# Patient Record
Sex: FEMALE | Race: WHITE | NOT HISPANIC OR LATINO | Employment: UNEMPLOYED | URBAN - METROPOLITAN AREA
[De-identification: names, ages, dates, MRNs, and addresses within clinical notes are randomized per-mention and may not be internally consistent; named-entity substitution may affect disease eponyms.]

---

## 2023-05-05 DIAGNOSIS — Z00.00 ROUTINE HEALTH MAINTENANCE: ICD-10-CM

## 2023-05-31 DIAGNOSIS — E78.00 HYPERCHOLESTEREMIA: Primary | ICD-10-CM

## 2023-05-31 RX ORDER — ATORVASTATIN CALCIUM 20 MG/1
1 TABLET, FILM COATED ORAL DAILY
COMMUNITY
Start: 2018-04-18 | End: 2023-05-31 | Stop reason: SDUPTHER

## 2023-05-31 RX ORDER — ATORVASTATIN CALCIUM 20 MG/1
20 TABLET, FILM COATED ORAL DAILY
Qty: 90 TABLET | Refills: 3 | Status: SHIPPED | OUTPATIENT
Start: 2023-05-31 | End: 2023-09-12 | Stop reason: DRUGHIGH

## 2023-06-01 NOTE — PROGRESS NOTES
Physical Exam    Name Kathrine Ramírez    Date of Service :6/5/2023      Kathrine Ramírez  68 y.o. is here for an annual physical exam  Past medical history significant for  Infertility but did conceive 1 child on her own and hospitalized for childbirth x1 she adopted a child also  Arthritis DJD especially knees  Renal calculi on a few occasions 2010 2016 recently September 2022  CT scan at that time showed right kidney stone and hydronephrosis also question of a left ovarian lesion  Osteopenia had been on Fosamax for over 10 years she has been off medications and bone density has been pretty stable  Hypercholesteremia on statin therapy  Chronic postnasal drip  Hyperglycemia and has followed routinely with endocrinology has considered going on metformin she sees Dr Aquino  She does follow with breast specialist because of increased risk of breast cancer  They live in [place] 6 months of the year  They have a new grandson who is 18 months old here in the [facility]    Past Medical History:   Diagnosis Date    Acute upper respiratory infection, unspecified 09/06/2017    Acute URI    Encounter for gynecological examination (general) (routine) without abnormal findings 06/10/2021    Well female exam with routine gynecological exam    Flushing     Hot flashes    Other conditions influencing health status     History of pregnancy    Personal history of other (healed) physical injury and trauma 09/06/2017    History of abrasion    Personal history of other diseases of the female genital tract 06/16/2015    History of postmenopausal bleeding    Superficial mycosis, unspecified 09/20/2017    Superficial fungal infection of skin       Past Surgical History:   Procedure Laterality Date    DILATION AND CURETTAGE OF UTERUS  11/26/2013    Dilation And Curettage    LAPAROSCOPY DIAGNOSTIC / BIOPSY / ASPIRATION / LYSIS  11/26/2013    Exploratory Laparoscopy        Social History     Tobacco Use    Smoking status: Never    Smokeless  "tobacco: Never   Vaping Use    Vaping status: Never Used        Social History     Social History Narrative    She is originally from [place] has been in [place] for some time then moved for her 's work    She has 2 children 1 biologic son who lives in [place] and adopted daughter who lives here in the [place] area    1 grandson who is 18 months old    They spend 6 months of the year in [place]    Her diet is pretty healthy    She exercises routinely walks daily plays a lot of pickleball    Has never been a smoker    She does not drink alcohol    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    Has not really worked since having children    She has been doing a lot of photography which is really her passion       Family History   Problem Relation Name Age of Onset    Dementia Mother      Lymphoma Father      Other (gout) Brother      Nephrolithiasis Brother          /72   Ht 1.473 m (4' 10\")   Wt 57.6 kg (127 lb)   BMI 26.54 kg/m²     Physical Exam    Physical examination  Reveals a well-developed woman in no acute distress    appearance is younger than her stated age she is suntanned  HEENT exam  Extraocular motion is intact  Tympanic membranes and external auditory canals are normal  Oropharynx is normal  There is no cervical lymphadenopathy appreciated  The thyroid is within normal limits    Lungs    clear to auscultation and percussion    Cardiovascular   regular rate and rhythm  No murmurs rubs or gallops are appreciated    Breast examination   No dominant masses nipple discharge or axillary lymphadenopathy is appreciated    Abdomen   soft nontender bowel sounds are positive   there is no organomegaly noted  She is up-to-date with gynecology    Periphery  Pulses are present without deficits noted  No peripheral edema is noted    Musculoskeletal  Gait is normal  Is no joint erythema or swelling noted  Range of motion is within normal limits  Strength " is 5 of 5 without deficits noted    Dermatology  No concerning skin lesions are noted    Neurology  No deficits are noted  Judgment appears appropriate  Mood and affect are appropriate     Results from last 7 days   Lab Units 06/02/23  0917   WBC AUTO x10E9/L 8.0   HEMOGLOBIN g/dL 14.0   HEMATOCRIT % 43.7   PLATELETS AUTO x10E9/L 243   NEUTROS PCT AUTO % 75.5   LYMPHS PCT AUTO % 17.7   MONOS PCT AUTO % 5.4   EOS PCT AUTO % 0.6      Results from last 7 days   Lab Units 06/02/23  0917   HEMOGLOBIN A1C % 6.4*     Results from last 7 days   Lab Units 06/02/23  0917   SODIUM mmol/L 141   POTASSIUM mmol/L 4.8   CHLORIDE mmol/L 105   CO2 mmol/L 29   BUN mg/dL 26*   CREATININE mg/dL 0.78   CALCIUM mg/dL 9.7   PROTEIN TOTAL g/dL 6.8   BILIRUBIN TOTAL mg/dL 0.5   ALK PHOS U/L 87   ALT U/L 17   AST U/L 17   GLUCOSE mg/dL 102*      Results from last 7 days   Lab Units 06/02/23  0917   CHOLESTEROL mg/dL 159   TRIGLYCERIDES mg/dL 74   HDL mg/dL 49.7   LDLF mg/dL 95           Results from last 7 days   Lab Units 06/02/23  0917   TSH mIU/L 2.73           No lab exists for component: UAPPEAR, UCOLOR  No components found for: UA  Lab on 06/02/2023   Component Date Value Ref Range Status    WBC 06/02/2023 8.0  4.4 - 11.3 x10E9/L Final    nRBC 06/02/2023 0.0  0.0 - 0.0 /100 WBC Final    RBC 06/02/2023 4.78  4.00 - 5.20 x10E12/L Final    Hemoglobin 06/02/2023 14.0  12.0 - 16.0 g/dL Final    Hematocrit 06/02/2023 43.7  36.0 - 46.0 % Final    MCV 06/02/2023 91  80 - 100 fL Final    MCHC 06/02/2023 32.0  32.0 - 36.0 g/dL Final    Platelets 06/02/2023 243  150 - 450 x10E9/L Final    RDW 06/02/2023 12.8  11.5 - 14.5 % Final    Neutrophils % 06/02/2023 75.5  40.0 - 80.0 % Final    Immature Granulocytes %, Automated 06/02/2023 0.4  0.0 - 0.9 % Final     Immature Granulocyte Count (IG) includes promyelocytes,    myelocytes and metamyelocytes but does not include bands.   Percent differential counts (%) should be interpreted in the   context of  the absolute cell counts (cells/L).    Lymphocytes % 06/02/2023 17.7  13.0 - 44.0 % Final    Monocytes % 06/02/2023 5.4  2.0 - 10.0 % Final    Eosinophils % 06/02/2023 0.6  0.0 - 6.0 % Final    Basophils % 06/02/2023 0.4  0.0 - 2.0 % Final    Neutrophils Absolute 06/02/2023 6.06  1.20 - 7.70 x10E9/L Final    Lymphocytes Absolute 06/02/2023 1.42  1.20 - 4.80 x10E9/L Final    Monocytes Absolute 06/02/2023 0.43  0.10 - 1.00 x10E9/L Final    Eosinophils Absolute 06/02/2023 0.05  0.00 - 0.70 x10E9/L Final    Basophils Absolute 06/02/2023 0.03  0.00 - 0.10 x10E9/L Final    Glucose 06/02/2023 102 (H)  74 - 99 mg/dL Final    Sodium 06/02/2023 141  136 - 145 mmol/L Final    Potassium 06/02/2023 4.8  3.5 - 5.3 mmol/L Final    Chloride 06/02/2023 105  98 - 107 mmol/L Final    Bicarbonate 06/02/2023 29  21 - 32 mmol/L Final    Anion Gap 06/02/2023 12  10 - 20 mmol/L Final    Urea Nitrogen 06/02/2023 26 (H)  6 - 23 mg/dL Final    Creatinine 06/02/2023 0.78  0.50 - 1.05 mg/dL Final    GFR Female 06/02/2023 82  >90 mL/min/1.73m2 Final     CALCULATIONS OF ESTIMATED GFR ARE PERFORMED   USING THE 2021 CKD-EPI STUDY REFIT EQUATION   WITHOUT THE RACE VARIABLE FOR THE IDMS-TRACEABLE   CREATININE METHODS.    https://jasn.asnjournals.org/content/early/2021/09/22/ASN.0038997473    Calcium 06/02/2023 9.7  8.6 - 10.6 mg/dL Final    Albumin 06/02/2023 4.1  3.4 - 5.0 g/dL Final    Alkaline Phosphatase 06/02/2023 87  33 - 136 U/L Final    Total Protein 06/02/2023 6.8  6.4 - 8.2 g/dL Final    AST 06/02/2023 17  9 - 39 U/L Final    Total Bilirubin 06/02/2023 0.5  0.0 - 1.2 mg/dL Final    ALT (SGPT) 06/02/2023 17  7 - 45 U/L Final     Patients treated with Sulfasalazine may generate    falsely decreased results for ALT.    CRP, High Sensitivity 06/02/2023 2.0  mg/L Final        hsCRP         INTERPRETATION       mg/L  -------------------------------------------      < 1.0      LOW RELATIVE RISK OF CVD      1.0-3.0    AVERAGE RELATIVE RISK OF CVD       > 3.0      HIGH RELATIVE RISK OF CVD     Source:  RADHA THuy ELLER. et al. CIRCULATION 2003;  107:499-511.    Hemoglobin A1C 06/02/2023 6.4 (A)  % Final         Diagnosis of Diabetes-Adults   Non-Diabetic: < or = 5.6%   Increased risk for developing diabetes: 5.7-6.4%   Diagnostic of diabetes: > or = 6.5%  .       Monitoring of Diabetes                Age (y)     Therapeutic Goal (%)   Adults:          >18           <7.0   Pediatrics:    13-18           <7.5                   7-12           <8.0                   0- 6            7.5-8.5   American Diabetes Association. Diabetes Care 33(S1), Jan 2010.    Estimated Average Glucose 06/02/2023 137  MG/DL Final    Cholesterol 06/02/2023 159  0 - 199 mg/dL Final    .      AGE      DESIRABLE   BORDERLINE HIGH   HIGH     0-19 Y     0 - 169       170 - 199     >/= 200    20-24 Y     0 - 189       190 - 224     >/= 225         >24 Y     0 - 199       200 - 239     >/= 240   **All ranges are based on fasting samples. Specific   therapeutic targets will vary based on patient-specific   cardiac risk.  .   Pediatric guidelines reference:Pediatrics 2011, 128(S5).   Adult guidelines reference: NCEP ATPIII Guidelines,     IRENE 2001, 258:2486-97  .   Venipuncture immediately after or during the    administration of Metamizole may lead to falsely   low results. Testing should be performed immediately   prior to Metamizole dosing.    HDL 06/02/2023 49.7  mg/dL Final    .      AGE      VERY LOW   LOW     NORMAL    HIGH       0-19 Y       < 35   < 40     40-45     ----    20-24 Y       ----   < 40       >45     ----      >24 Y       ----   < 40     40-60      >60  .    Cholesterol/HDL Ratio 06/02/2023 3.2   Final    REF VALUES  DESIRABLE  < 3.4  HIGH RISK  > 5.0    LDL 06/02/2023 95  0 - 99 mg/dL Final    .                           NEAR      BORD      AGE      DESIRABLE  OPTIMAL    HIGH     HIGH     VERY HIGH     0-19 Y     0 - 109     ---    110-129   >/= 130     ----    20-24 Y     0  - 119     ---    120-159   >/= 160     ----      >24 Y     0 -  99   100-129  130-159   160-189     >/=190  .    VLDL 06/02/2023 15  0 - 40 mg/dL Final    Triglycerides 06/02/2023 74  0 - 149 mg/dL Final    .      AGE      DESIRABLE   BORDERLINE HIGH   HIGH     VERY HIGH   0 D-90 D    19 - 174         ----         ----        ----  91 D- 9 Y     0 -  74        75 -  99     >/= 100      ----    10-19 Y     0 -  89        90 - 129     >/= 130      ----    20-24 Y     0 - 114       115 - 149     >/= 150      ----         >24 Y     0 - 149       150 - 199    200- 499    >/= 500  .   Venipuncture immediately after or during the    administration of Metamizole may lead to falsely   low results. Testing should be performed immediately   prior to Metamizole dosing.    TSH 06/02/2023 2.73  0.44 - 3.98 mIU/L Final     TSH testing is performed using different testing    methodology at [facility] than at other    [facility]. Direct result comparisons should    only be made within the same method.    WBC, Urine 06/02/2023 1  0 - 5 /HPF Final    RBC, Urine 06/02/2023 1  0 - 5 /HPF Final    Squamous Epithelial Cells, Urine 06/02/2023 4  /HPF Final    Bacteria, Urine 06/02/2023 1+ (A)  /HPF Final    Vitamin D, 25-Hydroxy 06/02/2023 74  ng/mL Final    .  DEFICIENCY:         < 20   NG/ML  INSUFFICIENCY:      20-29  NG/ML  SUFFICIENCY:         NG/ML    THIS ASSAY ACCURATELY QUANTIFIES THE SUM OF  VITAMIN D3, 25-HYDROXY AND VIT D2,25-HYDROXY.       ECG: normal sinus rhythm, no blocks or conduction defects, no ischemic changes.     Problem List Items Addressed This Visit          Circulatory    Elevated BP without diagnosis of hypertension       Genitourinary    Kidney stones       Musculoskeletal    Osteopenia       Other    Hyperglycemia     Other Visit Diagnoses       Asymptomatic menopausal state    -  Primary    Relevant Orders    XR DEXA bone density            Assessment/Plan   #1 prediabetes hemoglobin  A1c remains pretty stable in this prediabetic range at 6.4 and has been stable she would like to continue to follow her diet and exercise which is fine she does see endocrinology as well  2.  Hypercholesteremia cholesterol values have been under good control continue current regimen I do feel she could benefit from a coronary artery calcium score especially since she has prediabetes hypercholesteremia and borderline blood pressure readings with family history of heart disease as well therefore this has been ordered  3.  Elevated blood pressure reading note that I had gotten elevated blood pressure reading today medical assistant had not she has had very blood pressure readings as I look through her vital signs I wonder if she has some element of whitecoat hypertension I would like her to monitor her blood pressure at home routinely at least over the next few weeks and check in with us  4.  Renal calculi last episode October she does see urology  5.  Dense breast tissue some increased risk of breast cancer follows with high risk breast she is up-to-date with imaging  6.  DJD stable  7.  Health maintenance  Congratulated her on healthy lifestyle  Up-to-date with immunizations including Prevnar 13 and Pneumovax she has received Shingrix vaccination her last Covid 19 booster has been more than 6 months ago I have recommended this  Colonoscopy last 2020 2 repeat 5 years or 2027 because of polyp  Mammogram after September  We discussed adding yoga to her exercise regimen for balance  8.  Osteopenia bone density has been pretty stable she is due at any time for repeat bone density requisition was given she usually does this in [place]

## 2023-06-02 ENCOUNTER — LAB (OUTPATIENT)
Dept: LAB | Facility: LAB | Age: 68
End: 2023-06-02
Payer: MEDICARE

## 2023-06-02 DIAGNOSIS — Z00.00 ROUTINE HEALTH MAINTENANCE: ICD-10-CM

## 2023-06-02 LAB
ALANINE AMINOTRANSFERASE (SGPT) (U/L) IN SER/PLAS: 17 U/L (ref 7–45)
ALBUMIN (G/DL) IN SER/PLAS: 4.1 G/DL (ref 3.4–5)
ALKALINE PHOSPHATASE (U/L) IN SER/PLAS: 87 U/L (ref 33–136)
ANION GAP IN SER/PLAS: 12 MMOL/L (ref 10–20)
ASPARTATE AMINOTRANSFERASE (SGOT) (U/L) IN SER/PLAS: 17 U/L (ref 9–39)
BACTERIA, URINE: ABNORMAL /HPF
BASOPHILS (10*3/UL) IN BLOOD BY AUTOMATED COUNT: 0.03 X10E9/L (ref 0–0.1)
BASOPHILS/100 LEUKOCYTES IN BLOOD BY AUTOMATED COUNT: 0.4 % (ref 0–2)
BILIRUBIN TOTAL (MG/DL) IN SER/PLAS: 0.5 MG/DL (ref 0–1.2)
C REACTIVE PROTEIN (MG/L) IN SER/PLAS BY HIGH SENSIT: 2 MG/L
CALCIDIOL (25 OH VITAMIN D3) (NG/ML) IN SER/PLAS: 74 NG/ML
CALCIUM (MG/DL) IN SER/PLAS: 9.7 MG/DL (ref 8.6–10.6)
CARBON DIOXIDE, TOTAL (MMOL/L) IN SER/PLAS: 29 MMOL/L (ref 21–32)
CHLORIDE (MMOL/L) IN SER/PLAS: 105 MMOL/L (ref 98–107)
CHOLESTEROL (MG/DL) IN SER/PLAS: 159 MG/DL (ref 0–199)
CHOLESTEROL IN HDL (MG/DL) IN SER/PLAS: 49.7 MG/DL
CHOLESTEROL/HDL RATIO: 3.2
CREATININE (MG/DL) IN SER/PLAS: 0.78 MG/DL (ref 0.5–1.05)
EOSINOPHILS (10*3/UL) IN BLOOD BY AUTOMATED COUNT: 0.05 X10E9/L (ref 0–0.7)
EOSINOPHILS/100 LEUKOCYTES IN BLOOD BY AUTOMATED COUNT: 0.6 % (ref 0–6)
ERYTHROCYTE DISTRIBUTION WIDTH (RATIO) BY AUTOMATED COUNT: 12.8 % (ref 11.5–14.5)
ERYTHROCYTE MEAN CORPUSCULAR HEMOGLOBIN CONCENTRATION (G/DL) BY AUTOMATED: 32 G/DL (ref 32–36)
ERYTHROCYTE MEAN CORPUSCULAR VOLUME (FL) BY AUTOMATED COUNT: 91 FL (ref 80–100)
ERYTHROCYTES (10*6/UL) IN BLOOD BY AUTOMATED COUNT: 4.78 X10E12/L (ref 4–5.2)
ESTIMATED AVERAGE GLUCOSE FOR HBA1C: 137 MG/DL
GFR FEMALE: 82 ML/MIN/1.73M2
GLUCOSE (MG/DL) IN SER/PLAS: 102 MG/DL (ref 74–99)
HEMATOCRIT (%) IN BLOOD BY AUTOMATED COUNT: 43.7 % (ref 36–46)
HEMOGLOBIN (G/DL) IN BLOOD: 14 G/DL (ref 12–16)
HEMOGLOBIN A1C/HEMOGLOBIN TOTAL IN BLOOD: 6.4 %
IMMATURE GRANULOCYTES/100 LEUKOCYTES IN BLOOD BY AUTOMATED COUNT: 0.4 % (ref 0–0.9)
LDL: 95 MG/DL (ref 0–99)
LEUKOCYTES (10*3/UL) IN BLOOD BY AUTOMATED COUNT: 8 X10E9/L (ref 4.4–11.3)
LYMPHOCYTES (10*3/UL) IN BLOOD BY AUTOMATED COUNT: 1.42 X10E9/L (ref 1.2–4.8)
LYMPHOCYTES/100 LEUKOCYTES IN BLOOD BY AUTOMATED COUNT: 17.7 % (ref 13–44)
MONOCYTES (10*3/UL) IN BLOOD BY AUTOMATED COUNT: 0.43 X10E9/L (ref 0.1–1)
MONOCYTES/100 LEUKOCYTES IN BLOOD BY AUTOMATED COUNT: 5.4 % (ref 2–10)
NEUTROPHILS (10*3/UL) IN BLOOD BY AUTOMATED COUNT: 6.06 X10E9/L (ref 1.2–7.7)
NEUTROPHILS/100 LEUKOCYTES IN BLOOD BY AUTOMATED COUNT: 75.5 % (ref 40–80)
NRBC (PER 100 WBCS) BY AUTOMATED COUNT: 0 /100 WBC (ref 0–0)
PLATELETS (10*3/UL) IN BLOOD AUTOMATED COUNT: 243 X10E9/L (ref 150–450)
POTASSIUM (MMOL/L) IN SER/PLAS: 4.8 MMOL/L (ref 3.5–5.3)
PROTEIN TOTAL: 6.8 G/DL (ref 6.4–8.2)
RBC, URINE: 1 /HPF (ref 0–5)
SODIUM (MMOL/L) IN SER/PLAS: 141 MMOL/L (ref 136–145)
SQUAMOUS EPITHELIAL CELLS, URINE: 4 /HPF
THYROTROPIN (MIU/L) IN SER/PLAS BY DETECTION LIMIT <= 0.05 MIU/L: 2.73 MIU/L (ref 0.44–3.98)
TRIGLYCERIDE (MG/DL) IN SER/PLAS: 74 MG/DL (ref 0–149)
UREA NITROGEN (MG/DL) IN SER/PLAS: 26 MG/DL (ref 6–23)
VLDL: 15 MG/DL (ref 0–40)
WBC, URINE: 1 /HPF (ref 0–5)

## 2023-06-02 PROCEDURE — 83036 HEMOGLOBIN GLYCOSYLATED A1C: CPT

## 2023-06-02 PROCEDURE — 81001 URINALYSIS AUTO W/SCOPE: CPT

## 2023-06-02 PROCEDURE — 84443 ASSAY THYROID STIM HORMONE: CPT

## 2023-06-02 PROCEDURE — 86141 C-REACTIVE PROTEIN HS: CPT

## 2023-06-02 PROCEDURE — 80061 LIPID PANEL: CPT

## 2023-06-02 PROCEDURE — 36415 COLL VENOUS BLD VENIPUNCTURE: CPT

## 2023-06-02 PROCEDURE — 80053 COMPREHEN METABOLIC PANEL: CPT

## 2023-06-02 PROCEDURE — 85025 COMPLETE CBC W/AUTO DIFF WBC: CPT

## 2023-06-02 PROCEDURE — 82306 VITAMIN D 25 HYDROXY: CPT

## 2023-06-05 ENCOUNTER — OFFICE VISIT (OUTPATIENT)
Dept: PRIMARY CARE | Facility: CLINIC | Age: 68
End: 2023-06-05
Payer: MEDICARE

## 2023-06-05 VITALS
SYSTOLIC BLOOD PRESSURE: 146 MMHG | BODY MASS INDEX: 26.66 KG/M2 | DIASTOLIC BLOOD PRESSURE: 72 MMHG | WEIGHT: 127 LBS | HEIGHT: 58 IN

## 2023-06-05 DIAGNOSIS — Z78.0 ASYMPTOMATIC MENOPAUSAL STATE: Primary | ICD-10-CM

## 2023-06-05 DIAGNOSIS — R73.9 HYPERGLYCEMIA: ICD-10-CM

## 2023-06-05 DIAGNOSIS — M85.80 OSTEOPENIA, UNSPECIFIED LOCATION: ICD-10-CM

## 2023-06-05 DIAGNOSIS — Z00.00 PHYSICAL EXAM: ICD-10-CM

## 2023-06-05 DIAGNOSIS — N20.0 KIDNEY STONES: ICD-10-CM

## 2023-06-05 DIAGNOSIS — R03.0 ELEVATED BP WITHOUT DIAGNOSIS OF HYPERTENSION: ICD-10-CM

## 2023-06-05 DIAGNOSIS — E78.5 HYPERLIPIDEMIA, UNSPECIFIED HYPERLIPIDEMIA TYPE: ICD-10-CM

## 2023-06-05 PROBLEM — R92.2 DENSE BREASTS: Status: ACTIVE | Noted: 2023-06-05

## 2023-06-05 PROBLEM — R92.30 DENSE BREASTS: Status: ACTIVE | Noted: 2023-06-05

## 2023-06-05 PROBLEM — E88.810 DYSMETABOLIC SYNDROME: Status: ACTIVE | Noted: 2023-06-05

## 2023-06-05 PROCEDURE — 93000 ELECTROCARDIOGRAM COMPLETE: CPT | Performed by: INTERNAL MEDICINE

## 2023-06-05 PROCEDURE — 1160F RVW MEDS BY RX/DR IN RCRD: CPT | Performed by: INTERNAL MEDICINE

## 2023-06-05 PROCEDURE — UHSPHYS PR UH SELECT PHYSICAL: Performed by: INTERNAL MEDICINE

## 2023-06-05 PROCEDURE — 1159F MED LIST DOCD IN RCRD: CPT | Performed by: INTERNAL MEDICINE

## 2023-06-05 PROCEDURE — 1036F TOBACCO NON-USER: CPT | Performed by: INTERNAL MEDICINE

## 2023-06-05 NOTE — PATIENT INSTRUCTIONS
It was good to see you.  You are doing a good job with your overall health care.   Your blood pressure is elevated today.  It has been elevated intermittently as I review records.  I would like you to check your blood pressure.  I recommend Omron blood pressure monitor.  Please check it daily for the next 2 to 3 weeks at various times during the day.  Report back to me your values if you would consistently be greater than 140 systolic may need to consider medications.  Your diastolic blood pressure readings seem to be fine over time  Mammogram will be due after September  Bone density repeat will be due anytime now I am going to give you the requisition appears you have had this performed at a [facility] the last couple times I am fine with you waiting until you return to [place] to do this  Repeat colonoscopy 2027  Continue routine regular exercise  Add yoga to your regimen   Dermatology follow up       I encourage you to stay active and healthy, and to follow these healthy habits:     Try to increase your intake of fish such as salmon and tuna and try to get 2 to 3 servings of fish per week.  Increase your intake of plant-based protein listed here:    Unprocessed nuts, walnuts, or almonds, Nuts and Seeds.      Green vegetables such as Broccoli, Peas, Greens, Spinach    Beans, Chickpeas, & Lentils    Other sources include Potatoes, Quinoa, Seaweed, Soymilk, Tempeh, and Tofu.  Increase food s higher in flavonoids found in black tea, green tea, apples, nuts, citrus fruit, berries, and dark chocolate.  You should avoid fried foods, and sugary or starchy foods and sugary drinks, and void saturated fats.    Try not to dine at restaurants frequently, and avoid fast food restaurants.      Try to get restful sleep approximately 7-9 hours every night.  Work towards getting 30 minutes of  moderate intensity exercise 4 to 5 days per week.  You should also try to exercise at least one hour per day with light  walking.

## 2023-06-20 DIAGNOSIS — I10 PRIMARY HYPERTENSION: Primary | ICD-10-CM

## 2023-06-20 RX ORDER — OLMESARTAN MEDOXOMIL 5 MG/1
5 TABLET ORAL DAILY
Qty: 30 TABLET | Refills: 11 | Status: SHIPPED | OUTPATIENT
Start: 2023-06-20 | End: 2023-06-21 | Stop reason: SDUPTHER

## 2023-06-21 DIAGNOSIS — I10 PRIMARY HYPERTENSION: ICD-10-CM

## 2023-06-21 RX ORDER — OLMESARTAN MEDOXOMIL 5 MG/1
5 TABLET ORAL DAILY
Qty: 90 TABLET | Refills: 3 | Status: SHIPPED | OUTPATIENT
Start: 2023-06-21 | End: 2024-05-31

## 2023-07-24 NOTE — PROGRESS NOTES
Subjective   Patient ID: Kathrine Ramírez is a 68 y.o. female.    HPI  Patient presents today in follow-up and for blood pressure check.  She is 68 years old she is generally very healthy  Hypercholesteremia and recently diagnosed with hypertension we had just started the olmesartan at 5 mg daily  She has been monitoring her blood pressures at home  the have been good at home sometimes as low as 107 systolic  She feels well  She however developed pain in the right side of her neck just over the last 2 days no known injury but is very stiff especially if she turns to the left the right side of her neck hurts it does not radiate into her arm      Review of Systems    Objective   Physical Exam  Well-developed no acute distress appears younger than age  HEENT exam there is some pain to palpation in the right trapezius muscle reflexes present without deficits noted  Lungs clear to auscultation percussion  Cardiovascular regular rate and rhythm    Assessment/Plan   1 hypertension under very good control continue current regimen she is feeling well with this medication and no undue side effects she does not need to check her pressure daily but to check it intermittently  2.  Neck pain no known injury no neurologic deficits we will try meloxicam 15 mg daily for the next 1 to 2 weeks we did discuss I do not want her to use anti-inflammatories long-term because of risk of high blood pressure chronic kidney disease she will let us know however if further problems issues or the neck pain does not resolve  3.  Health maintenance she just had dermatology appointment no suspicious lesions were noted had skin tag removed  20 minutes were spent with patient of which greater than 50% was spent in counseling and coordination of care  This note was partially generated using the Dragon voice recognition system.  There may be some incorrect wording ,grammar, spelling or punctuation errors that were not corrected prior to committing the note  to the medical record.   There are no diagnoses linked to this encounter.

## 2023-07-25 ENCOUNTER — OFFICE VISIT (OUTPATIENT)
Dept: PRIMARY CARE | Facility: CLINIC | Age: 68
End: 2023-07-25
Payer: MEDICARE

## 2023-07-25 ENCOUNTER — APPOINTMENT (OUTPATIENT)
Dept: PRIMARY CARE | Facility: CLINIC | Age: 68
End: 2023-07-25
Payer: MEDICARE

## 2023-07-25 VITALS
HEIGHT: 58 IN | DIASTOLIC BLOOD PRESSURE: 72 MMHG | BODY MASS INDEX: 27.5 KG/M2 | HEART RATE: 68 BPM | WEIGHT: 131 LBS | OXYGEN SATURATION: 95 % | SYSTOLIC BLOOD PRESSURE: 124 MMHG

## 2023-07-25 DIAGNOSIS — M54.2 NECK PAIN: Primary | ICD-10-CM

## 2023-07-25 DIAGNOSIS — I10 PRIMARY HYPERTENSION: ICD-10-CM

## 2023-07-25 PROCEDURE — 99213 OFFICE O/P EST LOW 20 MIN: CPT | Performed by: INTERNAL MEDICINE

## 2023-07-25 PROCEDURE — 3078F DIAST BP <80 MM HG: CPT | Performed by: INTERNAL MEDICINE

## 2023-07-25 PROCEDURE — 3074F SYST BP LT 130 MM HG: CPT | Performed by: INTERNAL MEDICINE

## 2023-07-25 PROCEDURE — 1036F TOBACCO NON-USER: CPT | Performed by: INTERNAL MEDICINE

## 2023-07-25 PROCEDURE — 1160F RVW MEDS BY RX/DR IN RCRD: CPT | Performed by: INTERNAL MEDICINE

## 2023-07-25 PROCEDURE — 1159F MED LIST DOCD IN RCRD: CPT | Performed by: INTERNAL MEDICINE

## 2023-07-25 PROCEDURE — 1126F AMNT PAIN NOTED NONE PRSNT: CPT | Performed by: INTERNAL MEDICINE

## 2023-07-25 RX ORDER — MELOXICAM 15 MG/1
15 TABLET ORAL DAILY
Qty: 30 TABLET | Refills: 1 | Status: SHIPPED | OUTPATIENT
Start: 2023-07-25 | End: 2023-09-23

## 2023-07-25 RX ORDER — ESTRADIOL 0.1 MG/G
CREAM VAGINAL
COMMUNITY
Start: 2016-06-13 | End: 2024-03-18 | Stop reason: SDUPTHER

## 2023-07-25 RX ORDER — GLUCOSAMINE/MSM/CHONDROIT SULF 500-166.6
TABLET ORAL
COMMUNITY

## 2023-07-25 ASSESSMENT — PAIN SCALES - GENERAL: PAINLEVEL: 0-NO PAIN

## 2023-09-12 DIAGNOSIS — E78.00 HYPERCHOLESTEREMIA: ICD-10-CM

## 2023-09-12 RX ORDER — ATORVASTATIN CALCIUM 40 MG/1
40 TABLET, FILM COATED ORAL DAILY
Qty: 90 TABLET | Refills: 3 | Status: SHIPPED | OUTPATIENT
Start: 2023-09-12 | End: 2024-09-11

## 2023-09-12 RX ORDER — ATORVASTATIN CALCIUM 40 MG/1
40 TABLET, FILM COATED ORAL DAILY
Qty: 90 TABLET | Refills: 3 | Status: SHIPPED | OUTPATIENT
Start: 2023-09-12 | End: 2023-09-12 | Stop reason: SDUPTHER

## 2023-09-28 DIAGNOSIS — Z00.00 HEALTHCARE MAINTENANCE: Primary | ICD-10-CM

## 2023-10-03 DIAGNOSIS — U07.1 COVID-19: Primary | ICD-10-CM

## 2023-10-17 ENCOUNTER — APPOINTMENT (OUTPATIENT)
Dept: ENDOCRINOLOGY | Facility: CLINIC | Age: 68
End: 2023-10-17
Payer: MEDICARE

## 2023-10-23 ENCOUNTER — APPOINTMENT (OUTPATIENT)
Dept: ENDOCRINOLOGY | Facility: CLINIC | Age: 68
End: 2023-10-23
Payer: MEDICARE

## 2023-11-28 DIAGNOSIS — R73.9 HYPERGLYCEMIA: Primary | ICD-10-CM

## 2023-11-28 DIAGNOSIS — E78.00 PURE HYPERCHOLESTEROLEMIA: ICD-10-CM

## 2024-03-18 DIAGNOSIS — N95.2 ATROPHIC VAGINITIS: ICD-10-CM

## 2024-03-18 RX ORDER — ESTRADIOL 0.1 MG/G
2 CREAM VAGINAL 3 TIMES WEEKLY
Qty: 72 G | Refills: 3 | Status: SHIPPED | OUTPATIENT
Start: 2024-03-18 | End: 2025-03-18

## 2024-05-08 ENCOUNTER — OFFICE VISIT (OUTPATIENT)
Dept: PRIMARY CARE | Facility: CLINIC | Age: 69
End: 2024-05-08
Payer: MEDICARE

## 2024-05-08 VITALS — DIASTOLIC BLOOD PRESSURE: 68 MMHG | SYSTOLIC BLOOD PRESSURE: 134 MMHG

## 2024-05-08 DIAGNOSIS — M54.16 ACUTE RADICULAR LOW BACK PAIN: Primary | ICD-10-CM

## 2024-05-08 PROCEDURE — 3075F SYST BP GE 130 - 139MM HG: CPT | Performed by: INTERNAL MEDICINE

## 2024-05-08 PROCEDURE — 99213 OFFICE O/P EST LOW 20 MIN: CPT | Performed by: INTERNAL MEDICINE

## 2024-05-08 PROCEDURE — 1125F AMNT PAIN NOTED PAIN PRSNT: CPT | Performed by: INTERNAL MEDICINE

## 2024-05-08 PROCEDURE — 3078F DIAST BP <80 MM HG: CPT | Performed by: INTERNAL MEDICINE

## 2024-05-08 PROCEDURE — 1159F MED LIST DOCD IN RCRD: CPT | Performed by: INTERNAL MEDICINE

## 2024-05-08 RX ORDER — MELOXICAM 15 MG/1
15 TABLET ORAL DAILY
Qty: 30 TABLET | Refills: 11 | Status: SHIPPED | OUTPATIENT
Start: 2024-05-08 | End: 2025-05-08

## 2024-05-08 ASSESSMENT — PAIN SCALES - GENERAL: PAINLEVEL: 4

## 2024-05-08 NOTE — PROGRESS NOTES
Subjective   Patient ID: Kathrine Ramírez is a 69 y.o. female.    HPI  Patient presents today with complaints of right leg pain which started about 1 week ago  Got up in the middle of night  felt like could not put any weight on her right leg   No 1 injury had been playing pickle ball 3 times weekly   All the way down the right side of her leg  Took meloxicam 1 time    It does sometimes bother her at nighttime  She went to urgent care when she was in [place] they did an x-ray of her hip which showed only mild arthritis they felt it was coming from her back as well      Just returned from [facility] yesterday    Past medical history is significant for  Hypertension  Hypercholesteremia  Osteopenia  Renal calculi    Review of Systems    Objective   Physical Exam  Well-developed appears younger than age no acute distress  HEENT exam is unremarkable  Lungs are clear to auscultation percussion  Cardiovascular regular rate and rhythm  Periphery is without edema  Strength is 5 of 5 without deficits noted  There is some pain to straight leg raise on the right    Assessment/Plan   #1 right leg pain consistent with nerve impingement from her back she is only try meloxicam 1 time I would like her to take the meloxicam routinely have given her some easy exercises for her back physical therapy as well if continued issues would obtain x-ray consider steroid treatment and ultimately if not improving would need MRI  2.  Hypertension good control she has been checking her blood pressure at home it has been good as well

## 2024-05-08 NOTE — PROGRESS NOTES
Right leg pain started a week ago worse at night and in the morning went to  in [place] thought maybe sciatica

## 2024-05-08 NOTE — PATIENT INSTRUCTIONS
I do feel the pain is coming from your back.  As we discussed lets have you take the meloxicam routinely for the next 1 to 2 weeks.  If you however are not feeling better in 1 week or if your symptoms get worse we will proceed with a steroid.  May need x-ray of your back at that time as well  Requisition for physical therapy given as well

## 2024-05-20 ENCOUNTER — APPOINTMENT (OUTPATIENT)
Dept: ENDOCRINOLOGY | Facility: CLINIC | Age: 69
End: 2024-05-20
Payer: MEDICARE

## 2024-05-31 DIAGNOSIS — I10 PRIMARY HYPERTENSION: ICD-10-CM

## 2024-05-31 RX ORDER — OLMESARTAN MEDOXOMIL 5 MG/1
5 TABLET ORAL DAILY
Qty: 90 TABLET | Refills: 0 | Status: SHIPPED | OUTPATIENT
Start: 2024-05-31

## 2024-06-02 RX ORDER — OLMESARTAN MEDOXOMIL 5 MG/1
5 TABLET ORAL DAILY
Qty: 90 TABLET | Refills: 0 | Status: SHIPPED | OUTPATIENT
Start: 2024-06-02

## 2024-07-03 ENCOUNTER — APPOINTMENT (OUTPATIENT)
Dept: ENDOCRINOLOGY | Facility: CLINIC | Age: 69
End: 2024-07-03
Payer: MEDICARE

## 2024-07-03 VITALS — DIASTOLIC BLOOD PRESSURE: 72 MMHG | WEIGHT: 131 LBS | SYSTOLIC BLOOD PRESSURE: 118 MMHG | BODY MASS INDEX: 27.38 KG/M2

## 2024-07-03 DIAGNOSIS — E88.810 DYSMETABOLIC SYNDROME: Primary | ICD-10-CM

## 2024-07-03 DIAGNOSIS — E78.5 HYPERLIPIDEMIA, UNSPECIFIED HYPERLIPIDEMIA TYPE: ICD-10-CM

## 2024-07-03 DIAGNOSIS — I10 PRIMARY HYPERTENSION: ICD-10-CM

## 2024-07-03 PROCEDURE — 3074F SYST BP LT 130 MM HG: CPT | Performed by: INTERNAL MEDICINE

## 2024-07-03 PROCEDURE — 1159F MED LIST DOCD IN RCRD: CPT | Performed by: INTERNAL MEDICINE

## 2024-07-03 PROCEDURE — 99214 OFFICE O/P EST MOD 30 MIN: CPT | Performed by: INTERNAL MEDICINE

## 2024-07-03 PROCEDURE — 3078F DIAST BP <80 MM HG: CPT | Performed by: INTERNAL MEDICINE

## 2024-07-03 NOTE — PROGRESS NOTES
Patient ID: Kathrine Ramírez is a 69 y.o. female who presents for Follow-up.  HPI  The patient comes in for follow up.    She has prediabetes impaired fasting glucose insulin resistance hyperlipidemia.    Since last being seen she has been unable to exercise because of sciatica.    She has been doing physical therapy and it has been getting better.    She has been watching her diet.    Physically she has no complaints.    ROS  Comprehensive review of systems is negative.    Objective   Physical Exam  Visit Vitals  /72      Vitals:    07/03/24 0848   Weight: 59.4 kg (131 lb)      Body mass index is 27.38 kg/m².      Weight 131 up 3 pounds to down 6    Eyes normal  ENT normal. No adenopathy  Thyroid palpable and normal. No nodules  Chest clear to auscultation  Heart sounds are normal  Abdomen nontender. Bowel sounds normal. No organomegaly  Feet are okay    Current Outpatient Medications   Medication Sig Dispense Refill    atorvastatin (Lipitor) 40 mg tablet Take 1 tablet (40 mg) by mouth once daily. 90 tablet 3    estradiol (Estrace) 0.01 % (0.1 mg/gram) vaginal cream Insert 0.5 Applicatorfuls (2 g) into the vagina 3 times a week. 72 g 3    meloxicam (Mobic) 15 mg tablet Take 1 tablet (15 mg) by mouth once daily. 30 tablet 11    olmesartan (BENIcar) 5 mg tablet TAKE ONE TABLET BY MOUTH ONCE DAILY 90 tablet 0    olmesartan (BENIcar) 5 mg tablet TAKE ONE TABLET BY MOUTH ONCE DAILY 90 tablet 0    omega 3-dha-epa-fish oil 360 mg-108 mg- 180 mg-1,200 mg capsule Take by mouth.       No current facility-administered medications for this visit.       Assessment/Plan     1.  Insulin resistance  2.  Prediabetes  3.  Impaired fasting glucose  4.  Hyperlipidemia    We again discussed insulin resistance this pathophysiology and its impact.    We again discussed metformin as an option which we will hold off on at this point.    Will see what she can do with diet and exercise over the next 3 months.    If she is still struggling  at that point we will look at metformin.    She will follow-up with me in 3 months sooner as needed.

## 2024-07-09 ENCOUNTER — APPOINTMENT (OUTPATIENT)
Dept: OBSTETRICS AND GYNECOLOGY | Facility: CLINIC | Age: 69
End: 2024-07-09
Payer: MEDICARE

## 2024-07-09 VITALS
WEIGHT: 131 LBS | SYSTOLIC BLOOD PRESSURE: 120 MMHG | HEIGHT: 58 IN | BODY MASS INDEX: 27.5 KG/M2 | DIASTOLIC BLOOD PRESSURE: 70 MMHG

## 2024-07-09 DIAGNOSIS — Z01.419 WELL WOMAN EXAM WITH ROUTINE GYNECOLOGICAL EXAM: Primary | ICD-10-CM

## 2024-07-09 DIAGNOSIS — Z12.31 VISIT FOR SCREENING MAMMOGRAM: ICD-10-CM

## 2024-07-09 PROCEDURE — 1159F MED LIST DOCD IN RCRD: CPT | Performed by: OBSTETRICS & GYNECOLOGY

## 2024-07-09 PROCEDURE — 3078F DIAST BP <80 MM HG: CPT | Performed by: OBSTETRICS & GYNECOLOGY

## 2024-07-09 PROCEDURE — 3074F SYST BP LT 130 MM HG: CPT | Performed by: OBSTETRICS & GYNECOLOGY

## 2024-07-09 PROCEDURE — 1126F AMNT PAIN NOTED NONE PRSNT: CPT | Performed by: OBSTETRICS & GYNECOLOGY

## 2024-07-09 PROCEDURE — 99397 PER PM REEVAL EST PAT 65+ YR: CPT | Performed by: OBSTETRICS & GYNECOLOGY

## 2024-07-09 ASSESSMENT — ENCOUNTER SYMPTOMS
GASTROINTESTINAL NEGATIVE: 0
CARDIOVASCULAR NEGATIVE: 0
HEMATOLOGIC/LYMPHATIC NEGATIVE: 0
MUSCULOSKELETAL NEGATIVE: 0
ENDOCRINE NEGATIVE: 0
CONSTITUTIONAL NEGATIVE: 0
ALLERGIC/IMMUNOLOGIC NEGATIVE: 0
PSYCHIATRIC NEGATIVE: 0
NEUROLOGICAL NEGATIVE: 0
RESPIRATORY NEGATIVE: 0
EYES NEGATIVE: 0

## 2024-07-09 ASSESSMENT — PAIN SCALES - GENERAL: PAINLEVEL: 0-NO PAIN

## 2024-07-09 NOTE — PROGRESS NOTES
Subjective   Patient ID: Kathrine Ramírez is a 69 y.o. female who presents for Annual Exam.  Pt is here for annual exam     Pt had recent DEXA and  has low bone mass     Pt also needs a mammogram and has no complaints today         Review of Systems   All other systems reviewed and are negative.      Objective   Physical Exam  Constitutional:       Appearance: She is normal weight.   Pulmonary:      Effort: Pulmonary effort is normal.   Genitourinary:     General: Normal vulva.      Vagina: Normal.      Cervix: Normal.      Uterus: Normal.       Adnexa: Right adnexa normal and left adnexa normal.      Rectum: Normal.   Musculoskeletal:      Cervical back: Neck supple.   Neurological:      Mental Status: She is alert.   Psychiatric:         Mood and Affect: Mood normal.         Behavior: Behavior normal.         Assessment/Plan   Diagnoses and all orders for this visit:  Well woman exam with routine gynecological exam  Visit for screening mammogram  -     BI mammo bilateral screening tomosynthesis; Future           Lucero Wiley MD 07/09/24 1:52 PM

## 2024-08-21 ENCOUNTER — OFFICE VISIT (OUTPATIENT)
Dept: PRIMARY CARE | Facility: CLINIC | Age: 69
End: 2024-08-21
Payer: MEDICARE

## 2024-08-21 VITALS
BODY MASS INDEX: 28.34 KG/M2 | OXYGEN SATURATION: 97 % | SYSTOLIC BLOOD PRESSURE: 138 MMHG | HEIGHT: 58 IN | DIASTOLIC BLOOD PRESSURE: 78 MMHG | WEIGHT: 135 LBS | TEMPERATURE: 97.7 F | HEART RATE: 73 BPM

## 2024-08-21 DIAGNOSIS — H92.01 RIGHT EAR PAIN: Primary | ICD-10-CM

## 2024-08-21 DIAGNOSIS — I10 PRIMARY HYPERTENSION: ICD-10-CM

## 2024-08-21 DIAGNOSIS — Z00.00 HEALTH MAINTENANCE EXAMINATION: ICD-10-CM

## 2024-08-21 PROCEDURE — 1160F RVW MEDS BY RX/DR IN RCRD: CPT | Performed by: INTERNAL MEDICINE

## 2024-08-21 PROCEDURE — 3075F SYST BP GE 130 - 139MM HG: CPT | Performed by: INTERNAL MEDICINE

## 2024-08-21 PROCEDURE — 99213 OFFICE O/P EST LOW 20 MIN: CPT | Performed by: INTERNAL MEDICINE

## 2024-08-21 PROCEDURE — 3008F BODY MASS INDEX DOCD: CPT | Performed by: INTERNAL MEDICINE

## 2024-08-21 PROCEDURE — 3078F DIAST BP <80 MM HG: CPT | Performed by: INTERNAL MEDICINE

## 2024-08-21 PROCEDURE — 1126F AMNT PAIN NOTED NONE PRSNT: CPT | Performed by: INTERNAL MEDICINE

## 2024-08-21 PROCEDURE — 1159F MED LIST DOCD IN RCRD: CPT | Performed by: INTERNAL MEDICINE

## 2024-08-21 PROCEDURE — 1036F TOBACCO NON-USER: CPT | Performed by: INTERNAL MEDICINE

## 2024-08-21 RX ORDER — PREDNISONE 20 MG/1
20 TABLET ORAL DAILY
Qty: 3 TABLET | Refills: 0 | Status: SHIPPED | OUTPATIENT
Start: 2024-08-21

## 2024-08-21 ASSESSMENT — PAIN SCALES - GENERAL: PAINLEVEL: 0-NO PAIN

## 2024-08-21 NOTE — PROGRESS NOTES
Subjective   Patient ID: Kathrine Ramírez is a 69 y.o. female.    HPI  Patient presents today with complaints of sore on tongue and ear pain now ongoing for  1 week  saw a sore on right side of tongue  then developed some discomfort in the right side of her face and into the right ear is not terrible which is bothersome no fevers no chills has not been sick otherwise she has been up-to-date with her dental work does not think it has anything with her dental work    Past medical history significant for  Hypertension  Hypercholesteremia  DJD      Review of Systems    Objective   Physical Exam  Well-developed age-appropriate no acute distress  Right side of the tongue there is a small lesion ulcer present  The right side of her face does appear slightly swollen compared to the left the TM is normal  Cranial nerves II through XII are intact without deficits noted    Assessment/Plan   #1 tongue lesion with ear discomfort which I think is all in relationship and seems to be more inflammation may have been a viral process lets start with just lingering however we will try prednisone 20 mg just for 3 days if symptoms persist I may have her see her dentist as it would also hurt in her mouth she will let us know if further problems or issues  2.  Hypertension adequate control continue current regimen  20 minutes were spent with patient of which greater than 50% was spent in counseling and coordination of care  This note was partially generated using the Dragon voice recognition system.  There may be some incorrect wording ,grammar, spelling or punctuation errors that were not corrected prior to committing the note to the medical record.

## 2024-08-26 DIAGNOSIS — I10 PRIMARY HYPERTENSION: ICD-10-CM

## 2024-08-26 RX ORDER — OLMESARTAN MEDOXOMIL 5 MG/1
5 TABLET ORAL DAILY
Qty: 90 TABLET | Refills: 0 | Status: SHIPPED | OUTPATIENT
Start: 2024-08-26

## 2024-09-23 ENCOUNTER — LAB (OUTPATIENT)
Dept: LAB | Facility: LAB | Age: 69
End: 2024-09-23
Payer: MEDICARE

## 2024-09-23 DIAGNOSIS — R73.9 HYPERGLYCEMIA: ICD-10-CM

## 2024-09-23 DIAGNOSIS — Z00.00 HEALTH MAINTENANCE EXAMINATION: ICD-10-CM

## 2024-09-23 DIAGNOSIS — E78.00 PURE HYPERCHOLESTEROLEMIA: ICD-10-CM

## 2024-09-23 LAB
25(OH)D3 SERPL-MCNC: 64 NG/ML (ref 30–100)
ALBUMIN SERPL BCP-MCNC: 4.4 G/DL (ref 3.4–5)
ALP SERPL-CCNC: 97 U/L (ref 33–136)
ALT SERPL W P-5'-P-CCNC: 18 U/L (ref 7–45)
ANION GAP SERPL CALC-SCNC: 14 MMOL/L (ref 10–20)
AST SERPL W P-5'-P-CCNC: 15 U/L (ref 9–39)
BASOPHILS # BLD AUTO: 0.02 X10*3/UL (ref 0–0.1)
BASOPHILS NFR BLD AUTO: 0.3 %
BILIRUB SERPL-MCNC: 0.6 MG/DL (ref 0–1.2)
BUN SERPL-MCNC: 25 MG/DL (ref 6–23)
CALCIUM SERPL-MCNC: 9.7 MG/DL (ref 8.6–10.3)
CHLORIDE SERPL-SCNC: 105 MMOL/L (ref 98–107)
CHOLEST SERPL-MCNC: 148 MG/DL (ref 0–199)
CHOLEST/HDLC SERPL: 3.2 {RATIO}
CO2 SERPL-SCNC: 26 MMOL/L (ref 21–32)
CREAT SERPL-MCNC: 0.78 MG/DL (ref 0.5–1.05)
CRP SERPL HS-MCNC: 1.8 MG/L
EGFRCR SERPLBLD CKD-EPI 2021: 82 ML/MIN/1.73M*2
EOSINOPHIL # BLD AUTO: 0.11 X10*3/UL (ref 0–0.7)
EOSINOPHIL NFR BLD AUTO: 1.6 %
ERYTHROCYTE [DISTWIDTH] IN BLOOD BY AUTOMATED COUNT: 12.8 % (ref 11.5–14.5)
EST. AVERAGE GLUCOSE BLD GHB EST-MCNC: 134 MG/DL
GLUCOSE SERPL-MCNC: 107 MG/DL (ref 74–99)
HBA1C MFR BLD: 6.3 %
HCT VFR BLD AUTO: 42.2 % (ref 36–46)
HDLC SERPL-MCNC: 46.8 MG/DL
HGB BLD-MCNC: 13.5 G/DL (ref 12–16)
IMM GRANULOCYTES # BLD AUTO: 0.01 X10*3/UL (ref 0–0.7)
IMM GRANULOCYTES NFR BLD AUTO: 0.1 % (ref 0–0.9)
LDLC SERPL CALC-MCNC: 77 MG/DL
LYMPHOCYTES # BLD AUTO: 1.42 X10*3/UL (ref 1.2–4.8)
LYMPHOCYTES NFR BLD AUTO: 20.1 %
MCH RBC QN AUTO: 28.8 PG (ref 26–34)
MCHC RBC AUTO-ENTMCNC: 32 G/DL (ref 32–36)
MCV RBC AUTO: 90 FL (ref 80–100)
MONOCYTES # BLD AUTO: 0.57 X10*3/UL (ref 0.1–1)
MONOCYTES NFR BLD AUTO: 8.1 %
NEUTROPHILS # BLD AUTO: 4.94 X10*3/UL (ref 1.2–7.7)
NEUTROPHILS NFR BLD AUTO: 69.8 %
NON HDL CHOLESTEROL: 101 MG/DL (ref 0–149)
NRBC BLD-RTO: NORMAL /100{WBCS}
PLATELET # BLD AUTO: 303 X10*3/UL (ref 150–450)
POTASSIUM SERPL-SCNC: 4.5 MMOL/L (ref 3.5–5.3)
PROT SERPL-MCNC: 6.9 G/DL (ref 6.4–8.2)
RBC # BLD AUTO: 4.68 X10*6/UL (ref 4–5.2)
RBC #/AREA URNS AUTO: NORMAL /HPF
SODIUM SERPL-SCNC: 140 MMOL/L (ref 136–145)
SQUAMOUS #/AREA URNS AUTO: NORMAL /HPF
TRIGL SERPL-MCNC: 121 MG/DL (ref 0–149)
TSH SERPL-ACNC: 3.15 MIU/L (ref 0.44–3.98)
VLDL: 24 MG/DL (ref 0–40)
WBC # BLD AUTO: 7.1 X10*3/UL (ref 4.4–11.3)
WBC #/AREA URNS AUTO: NORMAL /HPF

## 2024-09-23 PROCEDURE — 85025 COMPLETE CBC W/AUTO DIFF WBC: CPT

## 2024-09-23 PROCEDURE — 81001 URINALYSIS AUTO W/SCOPE: CPT

## 2024-09-23 PROCEDURE — 80061 LIPID PANEL: CPT

## 2024-09-23 PROCEDURE — 36415 COLL VENOUS BLD VENIPUNCTURE: CPT

## 2024-09-23 PROCEDURE — 83036 HEMOGLOBIN GLYCOSYLATED A1C: CPT

## 2024-09-23 PROCEDURE — 82306 VITAMIN D 25 HYDROXY: CPT

## 2024-09-23 PROCEDURE — 84443 ASSAY THYROID STIM HORMONE: CPT

## 2024-09-23 PROCEDURE — 86141 C-REACTIVE PROTEIN HS: CPT

## 2024-09-23 PROCEDURE — 80053 COMPREHEN METABOLIC PANEL: CPT

## 2024-09-23 ASSESSMENT — PROMIS GLOBAL HEALTH SCALE
RATE_MENTAL_HEALTH: EXCELLENT
RATE_PHYSICAL_HEALTH: VERY GOOD
RATE_GENERAL_HEALTH: VERY GOOD
EMOTIONAL_PROBLEMS: NEVER
RATE_SOCIAL_SATISFACTION: EXCELLENT
RATE_QUALITY_OF_LIFE: EXCELLENT
RATE_AVERAGE_FATIGUE: MILD
CARRYOUT_SOCIAL_ACTIVITIES: EXCELLENT
RATE_AVERAGE_PAIN: 1
CARRYOUT_PHYSICAL_ACTIVITIES: COMPLETELY

## 2024-09-26 NOTE — PROGRESS NOTES
Physical Exam    Name Kathrine Ramírez    Date of Service :9/26/2024      Kathrine Ramírez  69 y.o. is here for an annual physical exam  Past medical history significant for  Infertility but did conceive 1 child on her own and hospitalized for childbirth x1 she adopted a child also  Arthritis DJD knees most recently low back pain with sciatica right side has been the issue  Renal calculi on a few occasions 2010 2016 recently September 2022  CT scan at that time showed right kidney stone and hydronephrosis also question of a left ovarian lesion  Osteopenia had been on Fosamax for over 10 years she has been off medications and bone density has been pretty stable  last bone density scan was performed in [place] 2023 and worst T-score was -2.2  Hypercholesteremia on statin therapy  She did have an elevated coronary artery calcium score in this past year 135.6   Hypertension currently with good control on low-dose medications  Chronic postnasal drip  Hyperglycemia and has followed routinely with endocrinology has considered going on metformin she sees Dr Aquino  She does follow with breast specialist because of increased risk of breast cancer  They live in [place] 6 months of the year   She has 1 grandson here in [place] with a granddaughter do in the next couple of months    Past Medical History:   Diagnosis Date    Acute upper respiratory infection, unspecified 09/06/2017    Acute URI    Encounter for gynecological examination (general) (routine) without abnormal findings 06/10/2021    Well female exam with routine gynecological exam    Flushing     Hot flashes    Other conditions influencing health status     History of pregnancy    Personal history of other (healed) physical injury and trauma 09/06/2017    History of abrasion    Personal history of other diseases of the female genital tract 06/16/2015    History of postmenopausal bleeding    Superficial mycosis, unspecified 09/20/2017    Superficial fungal infection of  skin       Past Surgical History:   Procedure Laterality Date    DILATION AND CURETTAGE OF UTERUS  11/26/2013    Dilation And Curettage    LAPAROSCOPY DIAGNOSTIC / BIOPSY / ASPIRATION / LYSIS  11/26/2013    Exploratory Laparoscopy        Social History     Tobacco Use    Smoking status: Never    Smokeless tobacco: Never   Vaping Use    Vaping status: Never Used        Social History     Social History Narrative    She is originally from [place] has been in [place] for some time then moved for her 's work    She has 2 children 1 biologic son who lives in [place] and adopted daughter who lives here in the [place] area    1 grandson who is 18 months old    They spend 6 months of the year in [place]    Her diet is pretty healthy    She exercises routinely walks daily plays a lot of pickleball    Has never been a smoker    She does not drink alcohol    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    Physical Exam  Physical examination  Reveals a well-developed woman in no acute distress    appearance is younger than stated age Short stature  HEENT exam  Extraocular motion is intact  Tympanic membranes and external auditory canals are normal  Oropharynx is normal  There is no cervical lymphadenopathy appreciated  The thyroid is within normal limits    Lungs    clear to auscultation and percussion    Cardiovascular   regular rate and rhythm  No murmurs rubs or gallops are appreciated    Breast examination   No dominant masses nipple discharge or axillary lymphadenopathy is appreciated    Abdomen   soft nontender  "bowel sounds are positive   there is no organomegaly noted      Periphery  Pulses are present without deficits noted  No peripheral edema is noted    Musculoskeletal  Gait is normal  Is no joint erythema or swelling noted  Range of motion is within normal limits  Strength is 5 of 5 without deficits noted    Dermatology  No concerning skin lesions are noted    Neurology  No deficits are noted  Judgment appears appropriate  Mood and affect are appropriate         Results from last 7 days   Lab Units 09/23/24  1010   WBC AUTO x10*3/uL 7.1   HEMOGLOBIN g/dL 13.5   HEMATOCRIT % 42.2   PLATELETS AUTO x10*3/uL 303   NEUTROS PCT AUTO % 69.8   LYMPHS PCT AUTO % 20.1   MONOS PCT AUTO % 8.1   EOS PCT AUTO % 1.6      Results from last 7 days   Lab Units 09/23/24  1010   HEMOGLOBIN A1C % 6.3*     Results from last 7 days   Lab Units 09/23/24  1010   SODIUM mmol/L 140   POTASSIUM mmol/L 4.5   CHLORIDE mmol/L 105   CO2 mmol/L 26   BUN mg/dL 25*   CREATININE mg/dL 0.78   CALCIUM mg/dL 9.7   PROTEIN TOTAL g/dL 6.9   BILIRUBIN TOTAL mg/dL 0.6   ALK PHOS U/L 97   ALT U/L 18   AST U/L 15   GLUCOSE mg/dL 107*      Results from last 7 days   Lab Units 09/23/24  1010   CHOLESTEROL mg/dL 148   TRIGLYCERIDES mg/dL 121   HDL mg/dL 46.8           Results from last 7 days   Lab Units 09/23/24  1010   TSH mIU/L 3.15           No lab exists for component: \"UAPPEAR\", \"UCOLOR\"  No components found for: \"UA\"  Lab on 09/23/2024   Component Date Value Ref Range Status    Glucose 09/23/2024 107 (H)  74 - 99 mg/dL Final    Sodium 09/23/2024 140  136 - 145 mmol/L Final    Potassium 09/23/2024 4.5  3.5 - 5.3 mmol/L Final    Chloride 09/23/2024 105  98 - 107 mmol/L Final    Bicarbonate 09/23/2024 26  21 - 32 mmol/L Final    Anion Gap 09/23/2024 14  10 - 20 mmol/L Final    Urea Nitrogen 09/23/2024 25 (H)  6 - 23 mg/dL Final    Creatinine 09/23/2024 0.78  0.50 - 1.05 mg/dL Final    eGFR 09/23/2024 82  >60 mL/min/1.73m*2 Final    Calculations of estimated " GFR are performed using the 2021 CKD-EPI Study Refit equation without the race variable for the IDMS-Traceable creatinine methods.  https://jasn.asnjournals.org/content/early/2021/09/22/ASN.3064882421    Calcium 09/23/2024 9.7  8.6 - 10.3 mg/dL Final    Albumin 09/23/2024 4.4  3.4 - 5.0 g/dL Final    Alkaline Phosphatase 09/23/2024 97  33 - 136 U/L Final    Total Protein 09/23/2024 6.9  6.4 - 8.2 g/dL Final    AST 09/23/2024 15  9 - 39 U/L Final    Bilirubin, Total 09/23/2024 0.6  0.0 - 1.2 mg/dL Final    ALT 09/23/2024 18  7 - 45 U/L Final    Patients treated with Sulfasalazine may generate falsely decreased results for ALT.    Cholesterol 09/23/2024 148  0 - 199 mg/dL Final          Age      Desirable   Borderline High   High     0-19 Y     0 - 169       170 - 199     >/= 200    20-24 Y     0 - 189       190 - 224     >/= 225         >24 Y     0 - 199       200 - 239     >/= 240   **All ranges are based on fasting samples. Specific   therapeutic targets will vary based on patient-specific   cardiac risk.    Pediatric guidelines reference:Pediatrics 2011, 128(S5).Adult guidelines reference: NCEP ATPIII Guidelines,IRENE 2001, 258:2486-97    Venipuncture immediately after or during the administration of Metamizole may lead to falsely low results. Testing should be performed immediately prior to Metamizole dosing.    HDL-Cholesterol 09/23/2024 46.8  mg/dL Final      Age       Very Low   Low     Normal    High    0-19 Y    < 35      < 40     40-45     ----  20-24 Y    ----     < 40      >45      ----        >24 Y      ----     < 40     40-60      >60      Cholesterol/HDL Ratio 09/23/2024 3.2   Final      Ref Values  Desirable  < 3.4  High Risk  > 5.0    LDL Calculated 09/23/2024 77  <=99 mg/dL Final                                Near   Borderline      AGE      Desirable  Optimal    High     High     Very High     0-19 Y     0 - 109     ---    110-129   >/= 130     ----    20-24 Y     0 - 119     ---    120-159   >/=  160     ----      >24 Y     0 -  99   100-129  130-159   160-189     >/=190      VLDL 09/23/2024 24  0 - 40 mg/dL Final    Triglycerides 09/23/2024 121  0 - 149 mg/dL Final       Age         Desirable   Borderline High   High     Very High   0 D-90 D    19 - 174         ----         ----        ----  91 D- 9 Y     0 -  74        75 -  99     >/= 100      ----    10-19 Y     0 -  89        90 - 129     >/= 130      ----    20-24 Y     0 - 114       115 - 149     >/= 150      ----         >24 Y     0 - 149       150 - 199    200- 499    >/= 500    Venipuncture immediately after or during the administration of Metamizole may lead to falsely low results. Testing should be performed immediately prior to Metamizole dosing.    Non HDL Cholesterol 09/23/2024 101  0 - 149 mg/dL Final          Age       Desirable   Borderline High   High     Very High     0-19 Y     0 - 119       120 - 144     >/= 145    >/= 160    20-24 Y     0 - 149       150 - 189     >/= 190      ----         >24 Y    30 mg/dL above LDL Cholesterol goal      Hemoglobin A1C 09/23/2024 6.3 (H)  See comment % Final    Estimated Average Glucose 09/23/2024 134  Not Established mg/dL Final    WBC 09/23/2024 7.1  4.4 - 11.3 x10*3/uL Final    nRBC 09/23/2024    Final    Not Measured    RBC 09/23/2024 4.68  4.00 - 5.20 x10*6/uL Final    Hemoglobin 09/23/2024 13.5  12.0 - 16.0 g/dL Final    Hematocrit 09/23/2024 42.2  36.0 - 46.0 % Final    MCV 09/23/2024 90  80 - 100 fL Final    MCH 09/23/2024 28.8  26.0 - 34.0 pg Final    MCHC 09/23/2024 32.0  32.0 - 36.0 g/dL Final    RDW 09/23/2024 12.8  11.5 - 14.5 % Final    Platelets 09/23/2024 303  150 - 450 x10*3/uL Final    Neutrophils % 09/23/2024 69.8  40.0 - 80.0 % Final    Immature Granulocytes %, Automated 09/23/2024 0.1  0.0 - 0.9 % Final    Immature Granulocyte Count (IG) includes promyelocytes, myelocytes and metamyelocytes but does not include bands. Percent differential counts (%) should be interpreted in the  context of the absolute cell counts (cells/UL).    Lymphocytes % 09/23/2024 20.1  13.0 - 44.0 % Final    Monocytes % 09/23/2024 8.1  2.0 - 10.0 % Final    Eosinophils % 09/23/2024 1.6  0.0 - 6.0 % Final    Basophils % 09/23/2024 0.3  0.0 - 2.0 % Final    Neutrophils Absolute 09/23/2024 4.94  1.20 - 7.70 x10*3/uL Final    Percent differential counts (%) should be interpreted in the context of the absolute cell counts (cells/uL).    Immature Granulocytes Absolute, Au* 09/23/2024 0.01  0.00 - 0.70 x10*3/uL Final    Lymphocytes Absolute 09/23/2024 1.42  1.20 - 4.80 x10*3/uL Final    Monocytes Absolute 09/23/2024 0.57  0.10 - 1.00 x10*3/uL Final    Eosinophils Absolute 09/23/2024 0.11  0.00 - 0.70 x10*3/uL Final    Basophils Absolute 09/23/2024 0.02  0.00 - 0.10 x10*3/uL Final    CRP, High Sensitivity 09/23/2024 1.8 (H)  <1.0 mg/L Final    Thyroid Stimulating Hormone 09/23/2024 3.15  0.44 - 3.98 mIU/L Final    WBC, Urine 09/23/2024 NONE  1-5, NONE /HPF Final    RBC, Urine 09/23/2024 NONE  NONE, 1-2, 3-5 /HPF Final    Squamous Epithelial Cells, Urine 09/23/2024 1-9 (SPARSE)  Reference range not established. /HPF Final    Vitamin D, 25-Hydroxy, Total 09/23/2024 64  30 - 100 ng/mL Final     [unfilled]   No results found.   The 10-year ASCVD risk score (Remedios WHEELER, et al., 2019) is: 13.7%    Values used to calculate the score:      Age: 69 years      Sex: Female      Is Non- : No      Diabetic: No      Tobacco smoker: No      Systolic Blood Pressure: 138 mmHg      Is BP treated: Yes      HDL Cholesterol: 46.8 mg/dL      Total Cholesterol: 148 mg/dL   .  ECG: Regular.     Problem List Items Addressed This Visit    None      Assessment/Plan   #1 hypercholesteremia with elevated coronary artery calcium score of 135.6 cholesterol is improved with the 40 mg of Lipitor we will continue the current regimen LDLs close to goal at 77  2.  Hyperglycemia hemoglobin A1c 6.3 this has been stable over time as well  she does see endocrinologist Dr Aquino and has entertained the possibility of metformin we did discuss metformin  3.  Hypertension blood pressure is a little higher than her previous range still acceptable which is the monitor at home some of this could be the meloxicam that she is continue to take she will stop this  4.  Sciatica finally improved feels pretty close to baseline she has not yet returned to all of her activities hopes to in the near future  5.  Osteopenia had been on bisphosphonates in the past plan to repeat bone density November 2025 she had done her last 1 in [place]  6.  Health maintenance  She has received her flu vaccine and her Covid 19 vaccination  Up-to-date with Tdap I have recommended RSV vaccination especially with the new baby coming  Just had mammogram performed today  Up-to-date with colonoscopy  Return to routine regular exercise she may benefit from T3 fitness evaluation and this has been scheduled

## 2024-09-30 ENCOUNTER — OFFICE VISIT (OUTPATIENT)
Dept: PRIMARY CARE | Facility: CLINIC | Age: 69
End: 2024-09-30
Payer: MEDICARE

## 2024-09-30 ENCOUNTER — APPOINTMENT (OUTPATIENT)
Dept: PRIMARY CARE | Facility: CLINIC | Age: 69
End: 2024-09-30
Payer: MEDICARE

## 2024-09-30 ENCOUNTER — HOSPITAL ENCOUNTER (OUTPATIENT)
Dept: RADIOLOGY | Facility: CLINIC | Age: 69
Discharge: HOME | End: 2024-09-30
Payer: MEDICARE

## 2024-09-30 VITALS
WEIGHT: 132 LBS | BODY MASS INDEX: 27.71 KG/M2 | SYSTOLIC BLOOD PRESSURE: 136 MMHG | DIASTOLIC BLOOD PRESSURE: 74 MMHG | HEIGHT: 58 IN

## 2024-09-30 VITALS — DIASTOLIC BLOOD PRESSURE: 70 MMHG | OXYGEN SATURATION: 98 % | HEART RATE: 66 BPM | SYSTOLIC BLOOD PRESSURE: 136 MMHG

## 2024-09-30 VITALS — BODY MASS INDEX: 28.32 KG/M2 | HEIGHT: 58 IN | WEIGHT: 134.92 LBS

## 2024-09-30 DIAGNOSIS — I10 PRIMARY HYPERTENSION: ICD-10-CM

## 2024-09-30 DIAGNOSIS — I10 PRIMARY HYPERTENSION: Primary | ICD-10-CM

## 2024-09-30 DIAGNOSIS — E88.810 DYSMETABOLIC SYNDROME: ICD-10-CM

## 2024-09-30 DIAGNOSIS — Z00.00 HEALTHCARE MAINTENANCE: ICD-10-CM

## 2024-09-30 DIAGNOSIS — E78.00 PURE HYPERCHOLESTEROLEMIA: Primary | ICD-10-CM

## 2024-09-30 DIAGNOSIS — E78.01 FAMILIAL HYPERCHOLESTEROLEMIA: ICD-10-CM

## 2024-09-30 PROCEDURE — 1159F MED LIST DOCD IN RCRD: CPT | Performed by: INTERNAL MEDICINE

## 2024-09-30 PROCEDURE — 77067 SCR MAMMO BI INCL CAD: CPT | Mod: BILATERAL PROCEDURE | Performed by: RADIOLOGY

## 2024-09-30 PROCEDURE — 77063 BREAST TOMOSYNTHESIS BI: CPT | Mod: BILATERAL PROCEDURE | Performed by: RADIOLOGY

## 2024-09-30 PROCEDURE — 1160F RVW MEDS BY RX/DR IN RCRD: CPT | Performed by: INTERNAL MEDICINE

## 2024-09-30 PROCEDURE — G0439 PPPS, SUBSEQ VISIT: HCPCS | Performed by: INTERNAL MEDICINE

## 2024-09-30 PROCEDURE — UHSPHYS PR UH SELECT PHYSICAL: Performed by: INTERNAL MEDICINE

## 2024-09-30 PROCEDURE — 3008F BODY MASS INDEX DOCD: CPT | Performed by: INTERNAL MEDICINE

## 2024-09-30 PROCEDURE — 3078F DIAST BP <80 MM HG: CPT | Performed by: INTERNAL MEDICINE

## 2024-09-30 PROCEDURE — 3075F SYST BP GE 130 - 139MM HG: CPT | Performed by: INTERNAL MEDICINE

## 2024-09-30 PROCEDURE — 1036F TOBACCO NON-USER: CPT | Performed by: INTERNAL MEDICINE

## 2024-09-30 PROCEDURE — 77067 SCR MAMMO BI INCL CAD: CPT

## 2024-09-30 RX ORDER — VIT C/E/ZN/COPPR/LUTEIN/ZEAXAN 250MG-90MG
CAPSULE ORAL
COMMUNITY
Start: 2013-11-26

## 2024-09-30 ASSESSMENT — PATIENT HEALTH QUESTIONNAIRE - PHQ9
2. FEELING DOWN, DEPRESSED OR HOPELESS: NOT AT ALL
1. LITTLE INTEREST OR PLEASURE IN DOING THINGS: NOT AT ALL
SUM OF ALL RESPONSES TO PHQ9 QUESTIONS 1 AND 2: 0

## 2024-09-30 ASSESSMENT — ENCOUNTER SYMPTOMS
DEPRESSION: 0
OCCASIONAL FEELINGS OF UNSTEADINESS: 0
LOSS OF SENSATION IN FEET: 0

## 2024-09-30 ASSESSMENT — ACTIVITIES OF DAILY LIVING (ADL)
TAKING_MEDICATION: INDEPENDENT
GROCERY_SHOPPING: INDEPENDENT
MANAGING_FINANCES: INDEPENDENT
DOING_HOUSEWORK: INDEPENDENT

## 2024-09-30 NOTE — PROGRESS NOTES
Chief Complaint: Medicare Wellness Exam/Comprehensive Problem Focused Follow Up and Physical Exam    HPI:    Past medical history significant for  Infertility but did conceive 1 child on her own and hospitalized for childbirth x1 she adopted a child also  Arthritis DJD knees most recently low back pain with sciatica right side has been the issue  Renal calculi on a few occasions 2010 2016 recently September 2022  CT scan at that time showed right kidney stone and hydronephrosis also question of a left ovarian lesion  Osteopenia had been on Fosamax for over 10 years she has been off medications and bone density has been pretty stable  last bone density scan was performed in [place] 2023 and worst T-score was -2.2  Hypercholesteremia on statin therapy  She did have an elevated coronary artery calcium score in this past year 135.6   Hypertension currently with good control on low-dose medications  Chronic postnasal drip  Hyperglycemia and has followed routinely with endocrinology has considered going on metformin she sees Dr Aquino  She does follow with breast specialist because of increased risk of breast cancer  They live in [place] 6 months of the year   She has 1 grandson here in [place] with a granddaughter do in the next couple of months  Active Problem List      Comprehensive Medical/Surgical/Social/Family History  Past Medical History:   Diagnosis Date    Acute upper respiratory infection, unspecified 09/06/2017    Acute URI    Allergic     Arthritis     Encounter for gynecological examination (general) (routine) without abnormal findings 06/10/2021    Well female exam with routine gynecological exam    Flushing     Hot flashes    Hypertension     Other conditions influencing health status     History of pregnancy    Personal history of other (healed) physical injury and trauma 09/06/2017    History of abrasion    Personal history of other diseases of the female genital tract 06/16/2015    History of  postmenopausal bleeding    Superficial mycosis, unspecified 09/20/2017    Superficial fungal infection of skin    Varicella 1982     Past Surgical History:   Procedure Laterality Date    ADENOIDECTOMY  1957    COSMETIC SURGERY  August 7, 2024    DILATION AND CURETTAGE OF UTERUS  11/26/2013    Dilation And Curettage    LAPAROSCOPY DIAGNOSTIC / BIOPSY / ASPIRATION / LYSIS  11/26/2013    Exploratory Laparoscopy    TONSILLECTOMY  1957    WISDOM TOOTH EXTRACTION  1974     Social History     Social History Narrative    She is originally from [place] has been in [place] for some time since 1988 then moved for her 's work    She has 2 children 1 biologic son who lives in [place] and adopted daughter who lives here in the [place] area    1 grandson and granddaughter on way     They spend 6 months of the year in [place]    Her diet is pretty healthy    Not  a lot of resistance training     She exercises routinely walks daily plays a lot of pickleball    Has never been a smoker    She does not drink alcohol    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      estradiol (Estrace) 0.01 % (0.1 mg/gram) vaginal cream Insert 0.5 Applicatorfuls (2 g) into the vagina 3 times a week. 72 g 3    meloxicam (Mobic) 15 mg tablet Take 1 tablet (15 mg) by mouth once daily. 30 tablet 11    olmesartan (BENIcar) 5 mg tablet TAKE ONE TABLET BY MOUTH ONCE DAILY 90 tablet 0    [DISCONTINUED] omega 3-dha-epa-fish oil 360 mg-108 mg- 180 mg-1,200 mg capsule Take by mouth.      atorvastatin (Lipitor) 40 mg tablet Take 1 tablet (40 mg) by mouth once daily. 90 tablet 3  "   [DISCONTINUED] olmesartan (BENIcar) 5 mg tablet TAKE ONE TABLET BY MOUTH ONCE DAILY 90 tablet 0    [DISCONTINUED] predniSONE (Deltasone) 20 mg tablet Take 1 tablet (20 mg) by mouth once daily. 3 tablet 0     No current facility-administered medications on file prior to visit.       Medications and Supplements  prescribed by me and other practitioners or clinical pharmacist (such as prescriptions, OTC's, herbal therapies and supplements) were reviewed and documented in the medical record.    Tobacco/Alcohol/Opioid use, as well as Illicit Drug Use was screened for/reviewed and documented in Social History section and medication list as appropriate  Activities of Daily Living  In your present state of health, do you have any difficulty performing the following activities?:   Preparing food and eating?: No  Bathing yourself: No  Getting dressed: No  Using the toilet:No  Moving around from place to place: No  In the past year have you fallen or had a near fall?:No    Depression Screen  (Note: if answer to either of the following is \"Yes\", then a more complete depression screening is indicated)   Q1: Over the past two weeks, have you felt down, depressed or hopeless? No  Q2: Over the past two weeks, have you felt little interest or pleasure in doing things? No    Current exercise habits: Usually walks for exercise as well as playing pickle ball has been out of her routine since her sciatica  Dietary issues discussed: Yes  Hearing difficulties: No  Safe in current home environment: yes  Visual Acuity assessed: no  Cognitive Impairment assessed: no       Advance directives  Advanced Care Planning (including a Living Will, Healthcare POA, as well as specific end of life choices and/or directives), was discussed fCardiac Risk Assessment  Cardiovascular risk was discussed and, if needed, lifestyle modifications recommended, including nutritional choices, exercise, and elimination of habits contributing to risk. We agreed on " "a plan to reduce the current cardiovascular risk based on above discussion as needed.  Aspirin use/disuse was discussed after reviewing the updated guidelines below:    Consider low dose Aspirin ( mg) use if the benefit for cardiovascular disease prevention outweighs risk for bleeding complications.   In general, low dose ASA should be considered:  In patients WITHOUT prior MI/stroke/PAD (primary prevention):   a. Age <60: Use if 10-year cardiovascular disease risk >20%, with discussion of risks and benefits with patient  b. Age 60-<70: Use if 10-year cardiovascular disease risk >20% and low bleeding (e.g., gastrointenstinal) risk, with discussion of risks and benefits with patient  c. Age >=70: Do not use    In patients WITH prior MI/stroke/PAD (secondary prevention):   Generally use unless extremely high bleeding (e.g., gastrointenstinal) risk, with discussion of risks and benefits with patient    ROS otherwise negative aside from what was mentioned above in HPI.    Vitals  Ht 1.473 m (4' 10\")   Wt 59.9 kg (132 lb)   BMI 27.59 kg/m²   Body mass index is 27.59 kg/m².  Physical Exam  Gen: Alert, NAD  HEENT:  PERRLA, EOMI, conjunctiva and sclera normal in appearance. External auditory canals/TMs normal; Oral cavity and posterior pharynx without lesions/exudate  Neck:  Supple with FROM; No masses/nodes palpable; Thyroid nontender and without nodules; No GENESIS  Respiratory:  Lungs CTAB  Cardiovascular:  Heart RRR. No M/R/G. Peripheral pulses equal bilaterally  Abdomen:  Soft, nontender, BS present throughout; No R/G/R; No HSM or masses palpated  Extremities:  FROM all extremities; Muscle strength grossly normal with good tone  Neuro:  CN II-XII intact; Reflexes 2+/2+; Gross motor and sensory intact  Skin:  No suspicious lesions present  Breast: No masses, skin lesions or nipple discharges, no axillary lymphadenopathy    Assessment and Plan:  Problem List Items Addressed This Visit    None    #1 hypercholesteremia " with elevated coronary artery calcium score of 135.6 cholesterol is improved with the 40 mg of Lipitor we will continue the current regimen LDLs close to goal at 77  2.  Hyperglycemia hemoglobin A1c 6.3 this has been stable over time as well she does see endocrinologist Dr Aquino and has entertained the possibility of metformin we did discuss metformin  3.  Hypertension blood pressure is a little higher than her previous range still acceptable which is the monitor at home some of this could be the meloxicam that she is continue to take she will stop this  4.  Sciatica finally improved feels pretty close to baseline she has not yet returned to all of her activities hopes to in the near future  5.  Osteopenia had been on bisphosphonates in the past plan to repeat bone density November 2025 she had done her last 1 in [place]  6.  Health maintenance  She has received her flu vaccine and her Covid 19 vaccination  Up-to-date with Tdap I have recommended RSV vaccination especially with the new baby coming  Just had mammogram performed today  Up-to-date with colonoscopy  Return to routine regular exercise she may benefit from T3 fitness evaluation and this has been scheduled         During the course of the visit the patient was educated and counseled about age appropriate screening and preventive services. Completed preventive screenings were documented in the chart and orders were placed for outstanding screenings/procedures as documented in the Assessment and Plan.      Patient Instructions (the written plan) was given to the patient at check out.      Payton Metzger MD

## 2024-09-30 NOTE — PATIENT INSTRUCTIONS
It was good to see you.  You are doing a good job with your overall health care.   Sugar and hemoglobin A1c remain elevated in that prediabetic range.  I do still think you would be a good candidate for metformin talk to Dr Aquino   Cholesterol is improved and acceptable  Hoping you can get back to routine regular exercise.  I do think T3 fitness evaluation may be filled for you  I do recommend RSV vaccination  Otherwise up-to-date with vaccinations  Will plan to repeat a bone density next year November 2025  Up to date with colonoscopy and mammogram   Blood pressure is high normal range  stop the meloxicam and check BP  Let me know if systolic blood pressure remains >140   I encourage you to stay active and healthy, and to follow these healthy habits:     Try to increase your intake of fish such as salmon and tuna and try to get 2 to 3 servings of fish per week.  Increase your intake of plant-based protein listed here:    Unprocessed nuts, walnuts, or almonds, Nuts and Seeds.      Green vegetables such as Broccoli, Peas, Greens, Spinach    Beans, Chickpeas, & Lentils    Other sources include Potatoes, Quinoa, Seaweed, Soymilk, Tempeh, and Tofu.  Increase food s higher in flavonoids found in black tea, green tea, apples, nuts, citrus fruit, berries, and dark chocolate.  You should avoid fried foods, and sugary or starchy foods and sugary drinks, and avoid saturated fats.    Try not to dine at restaurants frequently and avoid fast food restaurants.      Try to get restful sleep approximately 7-9 hours every night.  Work towards getting 30 minutes of  moderate intensity exercise 4 to 5 days per week.  You should also try to exercise at least one hour per day with light walking.

## 2024-10-07 ENCOUNTER — APPOINTMENT (OUTPATIENT)
Dept: OPHTHALMOLOGY | Facility: CLINIC | Age: 69
End: 2024-10-07
Payer: MEDICARE

## 2024-10-14 DIAGNOSIS — E78.00 HYPERCHOLESTEREMIA: ICD-10-CM

## 2024-10-14 RX ORDER — ATORVASTATIN CALCIUM 40 MG/1
40 TABLET, FILM COATED ORAL DAILY
Qty: 90 TABLET | Refills: 3 | Status: SHIPPED | OUTPATIENT
Start: 2024-10-14 | End: 2025-10-14

## 2024-10-16 ENCOUNTER — APPOINTMENT (OUTPATIENT)
Dept: ENDOCRINOLOGY | Facility: CLINIC | Age: 69
End: 2024-10-16
Payer: MEDICARE

## 2024-10-16 VITALS — SYSTOLIC BLOOD PRESSURE: 122 MMHG | DIASTOLIC BLOOD PRESSURE: 72 MMHG | WEIGHT: 128 LBS | BODY MASS INDEX: 26.75 KG/M2

## 2024-10-16 DIAGNOSIS — E88.810 DYSMETABOLIC SYNDROME: Primary | ICD-10-CM

## 2024-10-16 DIAGNOSIS — E78.5 HYPERLIPIDEMIA, UNSPECIFIED HYPERLIPIDEMIA TYPE: ICD-10-CM

## 2024-10-16 DIAGNOSIS — I10 PRIMARY HYPERTENSION: ICD-10-CM

## 2024-10-16 PROCEDURE — 99214 OFFICE O/P EST MOD 30 MIN: CPT | Performed by: INTERNAL MEDICINE

## 2024-10-16 PROCEDURE — 3078F DIAST BP <80 MM HG: CPT | Performed by: INTERNAL MEDICINE

## 2024-10-16 PROCEDURE — 3074F SYST BP LT 130 MM HG: CPT | Performed by: INTERNAL MEDICINE

## 2024-10-16 NOTE — PROGRESS NOTES
Patient ID: Kathrine Ramírez is a 69 y.o. female who presents for Follow-up.  HPI  The patient comes in for follow up.    She has prediabetes impaired fasting glucose insulin resistance hyperlipidemia.    Exercise was limited because of sciatica.    She has been heading back to it.    She has been watching her diet.    Physically she has no complaints.    ROS  Comprehensive review of systems is negative.    Objective   Physical Exam  Visit Vitals  /72      Vitals:    10/16/24 0931   Weight: 58.1 kg (128 lb)      Body mass index is 26.75 kg/m².      Weight 128 down 3 pounds for a total of 9    ENT normal. No adenopathy  Fundi normal  Thyroid palpable and normal. No nodules  Chest clear to auscultation  Heart sounds are normal  Abdomen nontender. Bowel sounds normal. No organomegaly  Feet are okay  Normal vibration and monofilament sensation normal pulses, no lesions    Current Outpatient Medications   Medication Sig Dispense Refill    atorvastatin (Lipitor) 40 mg tablet Take 1 tablet (40 mg) by mouth once daily. 90 tablet 3    cholecalciferol (Vitamin D-3) 25 MCG (1000 UT) capsule Take by mouth.      estradiol (Estrace) 0.01 % (0.1 mg/gram) vaginal cream Insert 0.5 Applicatorfuls (2 g) into the vagina 3 times a week. 72 g 3    meloxicam (Mobic) 15 mg tablet Take 1 tablet (15 mg) by mouth once daily. 30 tablet 11    olmesartan (BENIcar) 5 mg tablet TAKE ONE TABLET BY MOUTH ONCE DAILY 90 tablet 0     No current facility-administered medications for this visit.       Assessment/Plan     1.  Prediabetes  2.  Impaired fasting glucose  3.  Hyperlipidemia    We again discussed metformin as an option.    We reviewed her blood work from last month.    She will continue working her diet and exercise.    She will follow-up with me in 6 months sooner as needed.

## 2024-11-12 ENCOUNTER — OFFICE VISIT (OUTPATIENT)
Dept: PRIMARY CARE | Facility: CLINIC | Age: 69
End: 2024-11-12
Payer: MEDICARE

## 2024-11-12 ENCOUNTER — HOSPITAL ENCOUNTER (OUTPATIENT)
Dept: RADIOLOGY | Facility: HOSPITAL | Age: 69
Discharge: HOME | End: 2024-11-12
Payer: MEDICARE

## 2024-11-12 VITALS — SYSTOLIC BLOOD PRESSURE: 128 MMHG | DIASTOLIC BLOOD PRESSURE: 62 MMHG

## 2024-11-12 DIAGNOSIS — M54.50 LOW BACK PAIN POTENTIALLY ASSOCIATED WITH RADICULOPATHY: ICD-10-CM

## 2024-11-12 DIAGNOSIS — M54.50 LOW BACK PAIN POTENTIALLY ASSOCIATED WITH RADICULOPATHY: Primary | ICD-10-CM

## 2024-11-12 DIAGNOSIS — I10 PRIMARY HYPERTENSION: ICD-10-CM

## 2024-11-12 PROCEDURE — 3074F SYST BP LT 130 MM HG: CPT | Performed by: INTERNAL MEDICINE

## 2024-11-12 PROCEDURE — 72110 X-RAY EXAM L-2 SPINE 4/>VWS: CPT | Performed by: RADIOLOGY

## 2024-11-12 PROCEDURE — 1159F MED LIST DOCD IN RCRD: CPT | Performed by: INTERNAL MEDICINE

## 2024-11-12 PROCEDURE — 3078F DIAST BP <80 MM HG: CPT | Performed by: INTERNAL MEDICINE

## 2024-11-12 PROCEDURE — 1036F TOBACCO NON-USER: CPT | Performed by: INTERNAL MEDICINE

## 2024-11-12 PROCEDURE — 1160F RVW MEDS BY RX/DR IN RCRD: CPT | Performed by: INTERNAL MEDICINE

## 2024-11-12 PROCEDURE — 72110 X-RAY EXAM L-2 SPINE 4/>VWS: CPT

## 2024-11-12 PROCEDURE — 99213 OFFICE O/P EST LOW 20 MIN: CPT | Performed by: INTERNAL MEDICINE

## 2024-11-12 NOTE — PROGRESS NOTES
Subjective   Patient ID: Kathrine Ramírez is a 69 y.o. female.    HPI  Patient presents today in follow-up and with concerns of  back pain low back with radiation into the leg on the left side now   The back is not terrible but she has she notes when she bends over it feels worse but the left leg sometimes feels like it is going to give out on her as well she has a bit of pain if she palpates the left lateral leg  There was no injury  She notes even picking up something like a grocery bag sometimes hurts    She is currently in [place] as her daughter just had her second baby very exciting times      Review of Systems    Objective   Physical Exam  Well-developed no acute distress  Lungs are clear to auscultation percussion  Cardiovascular regular rate and rhythm  There is some pain to palpation along the left SI joint a bit of pain to palpation in the area of the greater trochanter on the left there is no pain to straight leg raise internal/external rotation on either side  Reflexes are present without deficits noted strength is 5 of 5 without deficits noted    Assessment/Plan   #1 left leg pain worsening think probably is more coming from her low back with radicular symptoms low neurologic deficits are noted possible this could be more piriformis or trochanteric bursitis I given her some easy exercises to do she will restart her meloxicam which she knows will not to take routinely because of her blood pressure if however she is not improved in the near future would perhaps add a steroid though we are obtaining an x-ray as well  2.  Hypertension adequate control continue current regimen

## 2024-11-12 NOTE — PATIENT INSTRUCTIONS
I think the pain is either coming from your back or could be more of a piriformis syndrome or even a trochanteric bursitis.  We are going to obtain an x-ray of your back  Back exercises given  Go back to taking the meloxicam for about the next 2 weeks to see if this helps  If it does not we may give you a short course of a steroid

## 2024-11-15 ENCOUNTER — PATIENT MESSAGE (OUTPATIENT)
Dept: PRIMARY CARE | Facility: CLINIC | Age: 69
End: 2024-11-15
Payer: MEDICARE

## 2024-11-15 DIAGNOSIS — M54.50 LOW BACK PAIN POTENTIALLY ASSOCIATED WITH RADICULOPATHY: Primary | ICD-10-CM

## 2024-11-18 DIAGNOSIS — I10 PRIMARY HYPERTENSION: ICD-10-CM

## 2024-11-18 RX ORDER — OLMESARTAN MEDOXOMIL 5 MG/1
5 TABLET ORAL DAILY
Qty: 90 TABLET | Refills: 0 | Status: SHIPPED | OUTPATIENT
Start: 2024-11-18

## 2025-02-12 DIAGNOSIS — I10 PRIMARY HYPERTENSION: ICD-10-CM

## 2025-02-12 RX ORDER — OLMESARTAN MEDOXOMIL 5 MG/1
5 TABLET ORAL DAILY
Qty: 90 TABLET | Refills: 0 | Status: SHIPPED | OUTPATIENT
Start: 2025-02-12

## 2025-02-12 RX ORDER — OLMESARTAN MEDOXOMIL 5 MG/1
5 TABLET ORAL DAILY
Qty: 90 TABLET | Refills: 0 | Status: SHIPPED | OUTPATIENT
Start: 2025-02-12 | End: 2025-02-12

## 2025-05-13 ENCOUNTER — OFFICE VISIT (OUTPATIENT)
Dept: PRIMARY CARE | Facility: CLINIC | Age: 70
End: 2025-05-13
Payer: MEDICARE

## 2025-05-13 VITALS
HEART RATE: 70 BPM | SYSTOLIC BLOOD PRESSURE: 132 MMHG | DIASTOLIC BLOOD PRESSURE: 70 MMHG | TEMPERATURE: 97.9 F | OXYGEN SATURATION: 95 %

## 2025-05-13 DIAGNOSIS — J01.00 ACUTE MAXILLARY SINUSITIS, RECURRENCE NOT SPECIFIED: Primary | ICD-10-CM

## 2025-05-13 DIAGNOSIS — I10 PRIMARY HYPERTENSION: ICD-10-CM

## 2025-05-13 PROBLEM — R03.0 ELEVATED BP WITHOUT DIAGNOSIS OF HYPERTENSION: Status: RESOLVED | Noted: 2023-06-05 | Resolved: 2025-05-13

## 2025-05-13 PROCEDURE — 1159F MED LIST DOCD IN RCRD: CPT | Performed by: INTERNAL MEDICINE

## 2025-05-13 PROCEDURE — 3075F SYST BP GE 130 - 139MM HG: CPT | Performed by: INTERNAL MEDICINE

## 2025-05-13 PROCEDURE — 99214 OFFICE O/P EST MOD 30 MIN: CPT | Performed by: INTERNAL MEDICINE

## 2025-05-13 PROCEDURE — 1036F TOBACCO NON-USER: CPT | Performed by: INTERNAL MEDICINE

## 2025-05-13 PROCEDURE — 1160F RVW MEDS BY RX/DR IN RCRD: CPT | Performed by: INTERNAL MEDICINE

## 2025-05-13 PROCEDURE — 3078F DIAST BP <80 MM HG: CPT | Performed by: INTERNAL MEDICINE

## 2025-05-13 RX ORDER — PREDNISONE 20 MG/1
20 TABLET ORAL DAILY
Qty: 3 TABLET | Refills: 0 | Status: SHIPPED | OUTPATIENT
Start: 2025-05-13

## 2025-05-13 RX ORDER — DOXYCYCLINE 100 MG/1
100 CAPSULE ORAL 2 TIMES DAILY
Qty: 14 CAPSULE | Refills: 0 | Status: SHIPPED | OUTPATIENT
Start: 2025-05-13 | End: 2025-05-20

## 2025-05-13 RX ORDER — OLMESARTAN MEDOXOMIL 5 MG/1
5 TABLET, FILM COATED ORAL DAILY
Qty: 90 TABLET | Refills: 0 | Status: SHIPPED | OUTPATIENT
Start: 2025-05-13

## 2025-05-13 NOTE — PROGRESS NOTES
Subjective   Patient ID: Kathrine Ramírez is a 70 y.o. female.    HPI  Patient presents today with complaints of feeling poorly since  4/24/25   She was on a cruise   she was [facility] and treated with Augmentin  for 10 days  finished  this a few days ago.  She continues to have a lot of sinus drainage and postnasal drip pressure her ears are cracking she did just travel yesterday she is exhausted as well she is no longer coughing much      Past medical history significant for  Hypercholesteremia  Hypertension  Chronic postnasal drip  Hyperglycemia metabolic syndrome following routinely with Dr Aquino   Recurrent renal calculi  She is blowing a lot of thick discolored drainage from her nose    Review of Systems    Objective   Physical Exam  Well-developed no acute distress somewhat hoarse  HEENT TMs are normal  There is some cobblestoning present in the posterior pharynx neck  Turbinates are not inflamed  There is pain to palpation over the maxillary sinuses    Assessment/Plan   #1 acute maxillary sinusitis she continues to use Flonase she completed Augmentin continues to have a lot of drainage feels pretty miserable I have opted to switch her antibiotics around and placed her on doxycycline 100 mg twice daily prednisone 20 mg daily just for 3 days continue nasal sprays could have some allergy component as well  2.  Hypertension blood pressure adequate control low-dose olmesartan continue current regimen

## 2025-05-15 ENCOUNTER — APPOINTMENT (OUTPATIENT)
Dept: ENDOCRINOLOGY | Facility: CLINIC | Age: 70
End: 2025-05-15

## 2025-05-20 ENCOUNTER — HOSPITAL ENCOUNTER (EMERGENCY)
Facility: HOSPITAL | Age: 70
Discharge: HOME | End: 2025-05-20
Attending: EMERGENCY MEDICINE
Payer: MEDICARE

## 2025-05-20 ENCOUNTER — APPOINTMENT (OUTPATIENT)
Dept: RADIOLOGY | Facility: HOSPITAL | Age: 70
End: 2025-05-20
Payer: MEDICARE

## 2025-05-20 VITALS
WEIGHT: 128 LBS | SYSTOLIC BLOOD PRESSURE: 138 MMHG | HEART RATE: 61 BPM | HEIGHT: 58 IN | OXYGEN SATURATION: 96 % | TEMPERATURE: 97.9 F | RESPIRATION RATE: 18 BRPM | BODY MASS INDEX: 26.87 KG/M2 | DIASTOLIC BLOOD PRESSURE: 56 MMHG

## 2025-05-20 DIAGNOSIS — N12 PYELONEPHRITIS: Primary | ICD-10-CM

## 2025-05-20 DIAGNOSIS — N83.8 OVARIAN MASS, LEFT: ICD-10-CM

## 2025-05-20 LAB
ALBUMIN SERPL BCP-MCNC: 4.3 G/DL (ref 3.4–5)
ALP SERPL-CCNC: 105 U/L (ref 33–136)
ALT SERPL W P-5'-P-CCNC: 32 U/L (ref 7–45)
ANION GAP SERPL CALC-SCNC: 13 MMOL/L (ref 10–20)
APPEARANCE UR: ABNORMAL
AST SERPL W P-5'-P-CCNC: 26 U/L (ref 9–39)
BACTERIA #/AREA URNS AUTO: ABNORMAL /HPF
BASOPHILS # BLD AUTO: 0.04 X10*3/UL (ref 0–0.1)
BASOPHILS NFR BLD AUTO: 0.5 %
BILIRUB SERPL-MCNC: 0.7 MG/DL (ref 0–1.2)
BILIRUB UR STRIP.AUTO-MCNC: NEGATIVE MG/DL
BUN SERPL-MCNC: 17 MG/DL (ref 6–23)
CALCIUM SERPL-MCNC: 9.3 MG/DL (ref 8.6–10.3)
CAOX CRY #/AREA UR COMP ASSIST: ABNORMAL /HPF
CHLORIDE SERPL-SCNC: 106 MMOL/L (ref 98–107)
CO2 SERPL-SCNC: 21 MMOL/L (ref 21–32)
COLOR UR: YELLOW
CREAT SERPL-MCNC: 0.65 MG/DL (ref 0.5–1.05)
EGFRCR SERPLBLD CKD-EPI 2021: >90 ML/MIN/1.73M*2
EOSINOPHIL # BLD AUTO: 0.24 X10*3/UL (ref 0–0.4)
EOSINOPHIL NFR BLD AUTO: 3 %
ERYTHROCYTE [DISTWIDTH] IN BLOOD BY AUTOMATED COUNT: 13 % (ref 11.5–14.5)
GLUCOSE SERPL-MCNC: 111 MG/DL (ref 74–99)
GLUCOSE UR STRIP.AUTO-MCNC: NORMAL MG/DL
HCT VFR BLD AUTO: 41 % (ref 36–46)
HGB BLD-MCNC: 13.3 G/DL (ref 12–16)
IMM GRANULOCYTES # BLD AUTO: 0.03 X10*3/UL (ref 0–0.5)
IMM GRANULOCYTES NFR BLD AUTO: 0.4 % (ref 0–0.9)
KETONES UR STRIP.AUTO-MCNC: NEGATIVE MG/DL
LACTATE SERPL-SCNC: 1 MMOL/L (ref 0.4–2)
LEUKOCYTE ESTERASE UR QL STRIP.AUTO: ABNORMAL
LYMPHOCYTES # BLD AUTO: 0.9 X10*3/UL (ref 0.8–3)
LYMPHOCYTES NFR BLD AUTO: 11.1 %
MCH RBC QN AUTO: 29.4 PG (ref 26–34)
MCHC RBC AUTO-ENTMCNC: 32.4 G/DL (ref 32–36)
MCV RBC AUTO: 91 FL (ref 80–100)
MONOCYTES # BLD AUTO: 0.38 X10*3/UL (ref 0.05–0.8)
MONOCYTES NFR BLD AUTO: 4.7 %
MUCOUS THREADS #/AREA URNS AUTO: ABNORMAL /LPF
NEUTROPHILS # BLD AUTO: 6.53 X10*3/UL (ref 1.6–5.5)
NEUTROPHILS NFR BLD AUTO: 80.3 %
NITRITE UR QL STRIP.AUTO: NEGATIVE
NRBC BLD-RTO: 0 /100 WBCS (ref 0–0)
PH UR STRIP.AUTO: 5.5 [PH]
PLATELET # BLD AUTO: 300 X10*3/UL (ref 150–450)
POTASSIUM SERPL-SCNC: 4.2 MMOL/L (ref 3.5–5.3)
PROT SERPL-MCNC: 7 G/DL (ref 6.4–8.2)
PROT UR STRIP.AUTO-MCNC: ABNORMAL MG/DL
RBC # BLD AUTO: 4.52 X10*6/UL (ref 4–5.2)
RBC # UR STRIP.AUTO: ABNORMAL MG/DL
RBC #/AREA URNS AUTO: >20 /HPF
SODIUM SERPL-SCNC: 136 MMOL/L (ref 136–145)
SP GR UR STRIP.AUTO: 1.02
SQUAMOUS #/AREA URNS AUTO: ABNORMAL /HPF
UROBILINOGEN UR STRIP.AUTO-MCNC: NORMAL MG/DL
WBC # BLD AUTO: 8.1 X10*3/UL (ref 4.4–11.3)
WBC #/AREA URNS AUTO: ABNORMAL /HPF

## 2025-05-20 PROCEDURE — 83605 ASSAY OF LACTIC ACID: CPT | Performed by: EMERGENCY MEDICINE

## 2025-05-20 PROCEDURE — 96365 THER/PROPH/DIAG IV INF INIT: CPT | Mod: 59

## 2025-05-20 PROCEDURE — 81001 URINALYSIS AUTO W/SCOPE: CPT | Performed by: EMERGENCY MEDICINE

## 2025-05-20 PROCEDURE — 87086 URINE CULTURE/COLONY COUNT: CPT | Mod: AHULAB | Performed by: EMERGENCY MEDICINE

## 2025-05-20 PROCEDURE — 74177 CT ABD & PELVIS W/CONTRAST: CPT | Performed by: STUDENT IN AN ORGANIZED HEALTH CARE EDUCATION/TRAINING PROGRAM

## 2025-05-20 PROCEDURE — 2500000004 HC RX 250 GENERAL PHARMACY W/ HCPCS (ALT 636 FOR OP/ED): Mod: JZ | Performed by: EMERGENCY MEDICINE

## 2025-05-20 PROCEDURE — 85025 COMPLETE CBC W/AUTO DIFF WBC: CPT | Performed by: EMERGENCY MEDICINE

## 2025-05-20 PROCEDURE — 36415 COLL VENOUS BLD VENIPUNCTURE: CPT | Performed by: EMERGENCY MEDICINE

## 2025-05-20 PROCEDURE — 2550000001 HC RX 255 CONTRASTS: Performed by: EMERGENCY MEDICINE

## 2025-05-20 PROCEDURE — 74177 CT ABD & PELVIS W/CONTRAST: CPT

## 2025-05-20 PROCEDURE — 99285 EMERGENCY DEPT VISIT HI MDM: CPT | Mod: 25 | Performed by: EMERGENCY MEDICINE

## 2025-05-20 PROCEDURE — 80053 COMPREHEN METABOLIC PANEL: CPT | Performed by: EMERGENCY MEDICINE

## 2025-05-20 RX ORDER — CEFTRIAXONE 1 G/50ML
1 INJECTION, SOLUTION INTRAVENOUS ONCE
Status: COMPLETED | OUTPATIENT
Start: 2025-05-20 | End: 2025-05-20

## 2025-05-20 RX ORDER — CEPHALEXIN 500 MG/1
500 CAPSULE ORAL 4 TIMES DAILY
Qty: 40 CAPSULE | Refills: 0 | Status: SHIPPED | OUTPATIENT
Start: 2025-05-20 | End: 2025-05-30

## 2025-05-20 RX ADMIN — CEFTRIAXONE 1 G: 1 INJECTION, SOLUTION INTRAVENOUS at 17:40

## 2025-05-20 RX ADMIN — IOHEXOL 75 ML: 350 INJECTION, SOLUTION INTRAVENOUS at 16:55

## 2025-05-20 ASSESSMENT — COLUMBIA-SUICIDE SEVERITY RATING SCALE - C-SSRS
1. IN THE PAST MONTH, HAVE YOU WISHED YOU WERE DEAD OR WISHED YOU COULD GO TO SLEEP AND NOT WAKE UP?: NO
6. HAVE YOU EVER DONE ANYTHING, STARTED TO DO ANYTHING, OR PREPARED TO DO ANYTHING TO END YOUR LIFE?: NO
2. HAVE YOU ACTUALLY HAD ANY THOUGHTS OF KILLING YOURSELF?: NO

## 2025-05-20 ASSESSMENT — PAIN DESCRIPTION - LOCATION: LOCATION: BACK

## 2025-05-20 ASSESSMENT — PAIN SCALES - GENERAL: PAINLEVEL_OUTOF10: 7

## 2025-05-20 ASSESSMENT — PAIN - FUNCTIONAL ASSESSMENT: PAIN_FUNCTIONAL_ASSESSMENT: 0-10

## 2025-05-20 NOTE — ED TRIAGE NOTES
Pt. To right lower back pain. Denies nausea and vomiting, bowel and bladder issues. Hx kidney stone

## 2025-05-20 NOTE — ED PROVIDER NOTES
Emergency Department Provider Note       History of Present Illness     History provided by: Patient  Limitations to History: None  External Records Reviewed with Brief Summary: None    HPI:  Kathrine Ramírez is a 70 y.o. female hx kidney stones that presents for right lower back pain - similar to prior stones.  Lasted ~30 min then subsided.  No n/v/d.  No hematuria.  No fever.  No pain at present.    Physical Exam   Triage vitals:  T 36.4 °C (97.6 °F)  HR 55  /83  RR 18  O2 98 % None (Room air)    General: Awake, alert, in no acute distress  Eyes: Gaze conjugate.  No scleral icterus or injection  HENT: Normo-cephalic, atraumatic. No stridor  CV: Regular rate, regular rhythm  Resp: Breathing non-labored, speaking in full sentences.  Clear to auscultation bilaterally  GI: Soft, non-distended, non-tender, no CVA tenderness at present, no rebound or guarding.  MSK/Extremities: No gross bony deformities. Moving all extremities  Skin: Warm. Appropriate color  Neuro: Alert. Oriented. Face symmetric. Speech is fluent.  Gross strength and sensation intact in b/l UE and LEs  Psych: Appropriate mood and affect      Medical Decision Making & ED Course   Medical Decision Makin y.o. female presents with R flank pain, now subsided.  Pt well appearing, hypertensive but otherwise stable, with abd soft/benign.  Labs without leukocytosis or DAVID.  UA with hemorrhagic UTI.  CT A/P without obstructing ureteral stone, though with enlarged L ovarian mass compared to prior.  Results reviewed with pt and family and will treat with IV ceftrixone for presumed R pyelonephritis.  Pt urged to follow up with gyn for the L ovarian mass (which she knows about but has not yet followed up) for further workup and treatment.  Ok to dc with PO antibiotics and return precautions.  ----      Differential diagnoses considered include but are not limited to: kidney stone, UTI/pyelonephritis, MSK strain    Independent Result Review and  Interpretation: Relevant laboratory and radiographic results were reviewed and independently interpreted by myself.  As necessary, they are commented on in the ED Course.    Chronic conditions affecting the patient's care: As documented above in [facility]    Care Considerations: As documented above in [facility]    ED Course:  Diagnoses as of 05/20/25 1744   Pyelonephritis   Ovarian mass, left       Disposition   As a result of the work-up, the patient was discharged home.  she was informed of her diagnosis and instructed to come back with any concerns or worsening of condition.  she and was agreeable to the plan as discussed above.  she was given the opportunity to ask questions.  All of the patient's questions were answered.    Procedures   Procedures        Elyse H Klerman, MD  Emergency Medicine                                                            Elyse H Klerman, MD  05/20/25 2528

## 2025-05-20 NOTE — ED TRIAGE NOTES
TRIAGE NOTE   I saw the patient as the Clinician in Triage and performed a brief history and physical exam, established acuity, and ordered appropriate tests to develop basic plan of care. Patient will be seen by an [facility], resident and/or physician who will independently evaluate the patient. Please see subsequent provider notes for further details and disposition.     Brief HPI: In brief, Kathrine Ramírez is a 70 y.o. female hx kidney stones that presents for right lower back pain - similar to prior stones.  Lasted ~30 min then subsided.  No n/v/d.  No hematuria.    Focused Physical exam:   Well appearing middle aged woman in NAD  RRR no murmurs  CTA b/l no wheezing  Abd soft ND NT no r/g no CVA tenderness     Plan/MDM:   Suspected kidney stone vs MSK strain  UA  Labs  CT A/P     Please see subsequent provider note for further details and disposition

## 2025-05-20 NOTE — DISCHARGE INSTRUCTIONS
You were seen in the ED for right sided back pain similar to prior kidney stones.  Workup shows a UTI, likely a right sided kidney infection, also stones in your left kidney (not causing symptoms) and slight growth of a left ovarian mass.  Please take the prescribed antibiotic as directed and follow up with urology if you are still having symptoms.  It is very important that you follow up with gynecology for the left ovarian mass, to ensure that this is not cancer and does not need treatment.  Return to the ED for high fever, inability to keep down fluids or other concerns.

## 2025-05-21 ENCOUNTER — OFFICE VISIT (OUTPATIENT)
Dept: OBSTETRICS AND GYNECOLOGY | Facility: CLINIC | Age: 70
End: 2025-05-21
Payer: MEDICARE

## 2025-05-21 VITALS — BODY MASS INDEX: 27.17 KG/M2 | WEIGHT: 130 LBS | SYSTOLIC BLOOD PRESSURE: 140 MMHG | DIASTOLIC BLOOD PRESSURE: 76 MMHG

## 2025-05-21 DIAGNOSIS — Z09 ENCOUNTER FOR FOLLOW-UP EXAMINATION AFTER COMPLETED TREATMENT FOR CONDITIONS OTHER THAN MALIGNANT NEOPLASM: ICD-10-CM

## 2025-05-21 DIAGNOSIS — R19.04 LEFT LOWER QUADRANT ABDOMINAL SWELLING, MASS AND LUMP: ICD-10-CM

## 2025-05-21 DIAGNOSIS — R19.00 PELVIC MASS: Primary | ICD-10-CM

## 2025-05-21 PROCEDURE — 1159F MED LIST DOCD IN RCRD: CPT | Performed by: STUDENT IN AN ORGANIZED HEALTH CARE EDUCATION/TRAINING PROGRAM

## 2025-05-21 PROCEDURE — 99214 OFFICE O/P EST MOD 30 MIN: CPT | Performed by: STUDENT IN AN ORGANIZED HEALTH CARE EDUCATION/TRAINING PROGRAM

## 2025-05-21 PROCEDURE — 1126F AMNT PAIN NOTED NONE PRSNT: CPT | Performed by: STUDENT IN AN ORGANIZED HEALTH CARE EDUCATION/TRAINING PROGRAM

## 2025-05-21 PROCEDURE — 3078F DIAST BP <80 MM HG: CPT | Performed by: STUDENT IN AN ORGANIZED HEALTH CARE EDUCATION/TRAINING PROGRAM

## 2025-05-21 PROCEDURE — G2211 COMPLEX E/M VISIT ADD ON: HCPCS | Performed by: STUDENT IN AN ORGANIZED HEALTH CARE EDUCATION/TRAINING PROGRAM

## 2025-05-21 PROCEDURE — 3077F SYST BP >= 140 MM HG: CPT | Performed by: STUDENT IN AN ORGANIZED HEALTH CARE EDUCATION/TRAINING PROGRAM

## 2025-05-21 ASSESSMENT — PAIN SCALES - GENERAL: PAINLEVEL_OUTOF10: 0-NO PAIN

## 2025-05-22 ENCOUNTER — APPOINTMENT (OUTPATIENT)
Dept: ENDOCRINOLOGY | Facility: CLINIC | Age: 70
End: 2025-05-22
Payer: MEDICARE

## 2025-05-22 ENCOUNTER — APPOINTMENT (OUTPATIENT)
Dept: RADIOLOGY | Facility: HOSPITAL | Age: 70
End: 2025-05-22
Payer: MEDICARE

## 2025-05-22 LAB
CANCER AG125 SERPL-ACNC: 12 U/ML
CEA SERPL-MCNC: <2 NG/ML

## 2025-05-23 ENCOUNTER — HOSPITAL ENCOUNTER (OUTPATIENT)
Dept: RADIOLOGY | Facility: HOSPITAL | Age: 70
Discharge: HOME | End: 2025-05-23
Payer: MEDICARE

## 2025-05-23 DIAGNOSIS — R19.00 PELVIC MASS: ICD-10-CM

## 2025-05-23 PROCEDURE — 76856 US EXAM PELVIC COMPLETE: CPT

## 2025-05-28 ENCOUNTER — APPOINTMENT (OUTPATIENT)
Dept: UROLOGY | Facility: HOSPITAL | Age: 70
End: 2025-05-28
Payer: MEDICARE

## 2025-06-02 ENCOUNTER — OFFICE VISIT (OUTPATIENT)
Dept: UROLOGY | Facility: HOSPITAL | Age: 70
End: 2025-06-02
Payer: MEDICARE

## 2025-06-02 DIAGNOSIS — N39.0 URINARY TRACT INFECTION WITHOUT HEMATURIA, SITE UNSPECIFIED: Primary | ICD-10-CM

## 2025-06-02 DIAGNOSIS — N20.0 KIDNEY STONES: ICD-10-CM

## 2025-06-02 LAB
POC APPEARANCE, URINE: CLEAR
POC BILIRUBIN, URINE: NEGATIVE
POC BLOOD, URINE: NEGATIVE
POC COLOR, URINE: YELLOW
POC GLUCOSE, URINE: NEGATIVE MG/DL
POC KETONES, URINE: NEGATIVE MG/DL
POC LEUKOCYTES, URINE: NEGATIVE
POC NITRITE,URINE: NEGATIVE
POC PH, URINE: 5.5 PH
POC PROTEIN, URINE: NEGATIVE MG/DL
POC SPECIFIC GRAVITY, URINE: >=1.03
POC UROBILINOGEN, URINE: 0.2 EU/DL

## 2025-06-02 PROCEDURE — 1036F TOBACCO NON-USER: CPT | Performed by: NURSE PRACTITIONER

## 2025-06-02 PROCEDURE — 99214 OFFICE O/P EST MOD 30 MIN: CPT | Mod: 25 | Performed by: NURSE PRACTITIONER

## 2025-06-02 PROCEDURE — 1160F RVW MEDS BY RX/DR IN RCRD: CPT | Performed by: NURSE PRACTITIONER

## 2025-06-02 PROCEDURE — G2211 COMPLEX E/M VISIT ADD ON: HCPCS | Performed by: NURSE PRACTITIONER

## 2025-06-02 PROCEDURE — 99204 OFFICE O/P NEW MOD 45 MIN: CPT | Performed by: NURSE PRACTITIONER

## 2025-06-02 PROCEDURE — 1159F MED LIST DOCD IN RCRD: CPT | Performed by: NURSE PRACTITIONER

## 2025-06-02 PROCEDURE — 51798 US URINE CAPACITY MEASURE: CPT | Performed by: NURSE PRACTITIONER

## 2025-06-02 PROCEDURE — 81003 URINALYSIS AUTO W/O SCOPE: CPT | Performed by: NURSE PRACTITIONER

## 2025-06-02 NOTE — PROGRESS NOTES
[facility] [place]  Outpatient Clinic Note    Patient Name:  Kathrine Ramírez  MRN:  [id number]  :  1955    Referring Provider: No ref. provider found  Date of Service: 2025   Visit type: New patient visit     problem list/Chief complaint:  Microscopic hematuria - resolved  Kidney stone non obstructing  UTI - resolved      HISTORY OF PRESENT ILLNESS:  Kathrine Ramírez is a 70 y.o. female with past medical history of hypercholesteremia, HTN, hyperglycemia metabolic syndrome, kidney stones, who presents for initial Urology visit. I performed a detailed review of the medical chart records lab testing and imaging. Patient referred to urology for possible UTI.  Patient reports occasional frequency, urgency and feeling like she has not emptied her bladder. She has nocturia x 2, no hematuria, no fever or chills. PVR 4 ml.    IPSS: 13  QOL: 3    I personally reviewed CT AP dated 25.   - Impression -  1.  No acute abnormality in the abdomen/pelvis. There are  nonobstructing left intrarenal calculi.  2. Interval slight increase of the previously seen left adnexal  lesion which is now located anterior to the uterus and likely  represents an ovarian lesion. Measures 5.4 x 3.4 cm. Follow-up pelvic  ultrasound is recommended to better characterize.      PAST MEDICAL HISTORY:  Medical History[1]    PAST SURGICAL HISTORY:  Surgical History[2]    ALLERGIES:  Allergies[3]    MEDICATIONS:  Current Outpatient Medications   Medication Instructions    atorvastatin (LIPITOR) 40 mg, oral, Daily    cholecalciferol (Vitamin D-3) 25 MCG (1000 UT) capsule Take by mouth.    estradiol (ESTRACE) 2 g, vaginal, 3 times weekly    olmesartan (BENICAR) 5 mg, oral, Daily    predniSONE (DELTASONE) 20 mg, oral, Daily        SOCIAL HISTORY:  Social History[4]     FAMILY HISTORY:  Family History[5]     REVIEW OF SYSTEMS:  10-pt ROS reviewed and negative except as mentioned above.    Vital signs:  There were no vitals taken for this  "visit.    PHYSICAL EXAMINATION:  General: Appears comfortable and in no apparent distress.  Head: Normocephalic, atraumatic  Eyes: Non-injected conjunctiva, sclera clear, no proptosis  Lungs: Breathing is easy, non-labored. Speaking in clear and complete sentences. Normal diaphragmatic movement.  Cardiovascular: no peripheral edema, cyanosis or pallor.   Abdomen: soft, non-distended, non-tender  : Bladder: non tender, not distended  MSK: Ambulatory with steady gait, unassisted  Skin: No visible rashes or lesions  Neurologic: Alert, oriented to person, place, and time  Psychiatric: mood and affect appropriate      IMAGING DATA:   === 05/20/25 ===    CT ABDOMEN PELVIS W IV CONTRAST    - Impression -  1.  No acute abnormality in the abdomen/pelvis. There are  nonobstructing left intrarenal calculi.  2. Interval slight increase of the previously seen left adnexal  lesion which is now located anterior to the uterus and likely  represents an ovarian lesion. Measures 5.4 x 3.4 cm. Follow-up pelvic  ultrasound is recommended to better characterize.      Signed by: Nate Mathis 5/20/2025 5:32 PM  Dictation workstation:   [id number]      LABORATORY DATA:    Lab Results   Component Value Date    WBC 8.1 05/20/2025    HGB 13.3 05/20/2025    HCT 41.0 05/20/2025    MCV 91 05/20/2025     05/20/2025     Lab Results   Component Value Date    GLUCOSE 111 (H) 05/20/2025    CALCIUM 9.3 05/20/2025     05/20/2025    K 4.2 05/20/2025    CO2 21 05/20/2025     05/20/2025    BUN 17 05/20/2025    CREATININE 0.65 05/20/2025         ASSESSMENT:  Kathrine Ramírez is a 70 y.o. female with UTI, kidney stone, microscopic hematuria, who presents for initial Urology visit after ER visit.    1.Today we discussed the definition of Overactive Bladder (OAB) as a clinical diagnosis that refers to \"urinary urgency, usually accompanied by frequency and nocturia, with or without urge incontinence, in the absence of urinary tract " "infection or any other obvious pathology.\" We discussed in detail the risk factors for OAB including bladder inflammation, chronic bladder outlet obstruction (Urethral stenosis), central nervous systems disorders, and vaginal delivery of a child, postmenopausal status. I had a long discussion with patient regarding the first line treatment for OAB is behavioral therapy with or without medication therapy.     Behavioral therapy include the following elements:   1) Avoid activities that exacerbate incontinence  2) Maintain a voiding diary  3) Timed/scheduled voiding to empty the bladder before incontinence or severe urgency occurs  4) Bladder training  5) Kegel exercises and pelvic floor muscle training  6) Fluid intake management-avoid excessive fluid intake  7) Dietary management-avoid bladder irritants (caffeine, alcohol, spicy food, acidic food)  8) Avoid constipation  9) Stop smoking (If currently smoking)    Patient was informed that second line treatment includes medications. We discussed Mirabegron and that the side effects include possible increase in blood pressure in a small minority of patients, however insurance does not always cover this. We also discussed anticholinergic medications which can have the side effects of dry eyes, dry mouth, constipation and rarely cognitive changes.    I mentioned 3rd line management options of neuromodulation and Botox injections as well    2.We discussed that most calculi under 5 mm can be safely observed. This recommendation is based on large series looking at spontaneous passage rates that suggest that the likelihood of stone passage is highest for stones under 4 mm in size (approximately 70-80%), moderate for stones between 4 and 6 mm (50%), and lowest for stones 6 mm or greater (20%-30%).     If unable to pass symptomatic/obstructing stone under 5 mm within 4-6 weeks, intervention is recommended.    For asymptomatic, non-infected, non-obstructing calyceal stone, then " observation is an option. Will repeat Renal US in 6 months.    However, stones under 5 mm that are in a solitary kidney, associated with infection or causing significant obstruction should be removed to prevent loss of renal function and/or sepsis. Also, every patient has different anatomy and different ability to pass calculi and tolerate pain, so intervention is also recommended in patients with small stones that are in significant discomfort or that have a history of being unable to pass small calculi.     Discussed possible interventions including: Extracorporeal shock wave lithotripsy (ESWL) for stones less than 1.5 cm. Ureteroscopy with stent placement and Percutaneous nephrolithotomy (PCNL)- minimally invasive surgery to remove stones bigger than 2 cm.    Reviewed dietary modifications required to help prevent recurrent stone formation:  1. Oral fluid intake sufficient to produce at least 2.5 liters of urine per day-the ideal oral fluids have high citrate content (such as lemonade or orange juice). Minimize carbonated drinks that contain phosphoric acid (ex/ dark brie)  2. Low sodium diet (< or = 2000mg/day), sodium intake increases urinary calcium  3. Low intake of animal protein (anything with a face)- limiting intake to <8 ounces/daily  4. Low oxalate diet; calcium intake with high oxalate foods  7. Moderate calcium intake (4083-4370 mg/day)-risk for stone formation increases when oral calcium intake is either too low or too high  8. Avoid high doses of Vitamin C (>500mg), as it metabolizes to oxalate  9. High fiber diet may reduce stone risk  10. Exercise!    PLAN:  -Reviewed CT AP results with patient  -UA today negative for infection or blood  -PVR 4 ml  -Patient will implement dietary and behavioral modifications to help with OAB and kidney stones  -She will discuss fibroids with OBGYN as possible cause for urinary urgency and frequency  -Patient was advised to present to local Emergency Department  for development of fevers, chills, nausea, vomiting or flank pain that is not controlled with oral pain medication.  -Follow up as needed    All questions and concerns were addressed. Patient verbalizes understanding and has no other questions at this time.     E&M visit today is associated with current or anticipated ongoing medical care services related to a patient's single, serious condition or a complex condition.    MARIA ESTHER Love-CNP  [facility] [place]  6/2/2025 2:39 PM         [1]   Past Medical History:  Diagnosis Date    Acute upper respiratory infection, unspecified 09/06/2017    Acute URI    Allergic     Arthritis     Encounter for gynecological examination (general) (routine) without abnormal findings 06/10/2021    Well female exam with routine gynecological exam    Flushing     Hot flashes    Hypertension     Other conditions influencing health status     History of pregnancy    Personal history of other (healed) physical injury and trauma 09/06/2017    History of abrasion    Personal history of other diseases of the female genital tract 06/16/2015    History of postmenopausal bleeding    Superficial mycosis, unspecified 09/20/2017    Superficial fungal infection of skin    Varicella 1982   [2]   Past Surgical History:  Procedure Laterality Date    ADENOIDECTOMY  1957    COSMETIC SURGERY  August 7, 2024    DILATION AND CURETTAGE OF UTERUS  11/26/2013    Dilation And Curettage    LAPAROSCOPY DIAGNOSTIC / BIOPSY / ASPIRATION / LYSIS  11/26/2013    Exploratory Laparoscopy    TONSILLECTOMY  1957    WISDOM TOOTH EXTRACTION  1974   [3]   Allergies  Allergen Reactions    Tramadol Other     Other reaction(s): Vomiting    Codeine Other     Headache    Other reaction(s): GI Intolerance   Headache   [4]   Social History  Tobacco Use    Smoking status: Never    Smokeless tobacco: Never   Vaping Use    Vaping status: Never Used   [5]   Family History  Problem Relation Name Age of Onset    Dementia Mother  Salma Ck Cruz 85        lived until 96    Arthritis Mother Salma Ck Cruz     Asthma Mother Salma Ck Cruz     Blood clot Mother Salma Ck Cruz     Lymphoma Father Pradeep Riosz           at 80    Coronary artery disease Father Pradeepsanam Cruz 68    Arthritis Father Pradeep Riosz     Cancer Father Pradeep Riosz     Other (gout) Brother      Nephrolithiasis Brother      Breast cancer Mother's Sister Velia Blair     Cancer Mother's Sister Zeinab Self

## 2025-06-05 DIAGNOSIS — M15.0 PRIMARY OSTEOARTHRITIS INVOLVING MULTIPLE JOINTS: Primary | ICD-10-CM

## 2025-06-05 RX ORDER — MELOXICAM 15 MG/1
15 TABLET ORAL DAILY
Qty: 30 TABLET | Refills: 0 | Status: SHIPPED | OUTPATIENT
Start: 2025-06-05

## 2025-07-08 DIAGNOSIS — Z00.00 HEALTH MAINTENANCE EXAMINATION: ICD-10-CM

## 2025-07-09 ENCOUNTER — APPOINTMENT (OUTPATIENT)
Dept: OBSTETRICS AND GYNECOLOGY | Facility: HOSPITAL | Age: 70
End: 2025-07-09
Payer: MEDICARE

## 2025-07-09 VITALS
BODY MASS INDEX: 27.84 KG/M2 | WEIGHT: 133.2 LBS | HEART RATE: 128 BPM | DIASTOLIC BLOOD PRESSURE: 77 MMHG | SYSTOLIC BLOOD PRESSURE: 160 MMHG

## 2025-07-09 DIAGNOSIS — N95.2 VAGINAL ATROPHY: Primary | ICD-10-CM

## 2025-07-09 DIAGNOSIS — Z12.31 ENCOUNTER FOR SCREENING MAMMOGRAM FOR MALIGNANT NEOPLASM OF BREAST: ICD-10-CM

## 2025-07-09 PROCEDURE — 99397 PER PM REEVAL EST PAT 65+ YR: CPT | Performed by: OBSTETRICS & GYNECOLOGY

## 2025-07-09 PROCEDURE — 3077F SYST BP >= 140 MM HG: CPT | Performed by: OBSTETRICS & GYNECOLOGY

## 2025-07-09 PROCEDURE — 1159F MED LIST DOCD IN RCRD: CPT | Performed by: OBSTETRICS & GYNECOLOGY

## 2025-07-09 PROCEDURE — 1126F AMNT PAIN NOTED NONE PRSNT: CPT | Performed by: OBSTETRICS & GYNECOLOGY

## 2025-07-09 PROCEDURE — 3078F DIAST BP <80 MM HG: CPT | Performed by: OBSTETRICS & GYNECOLOGY

## 2025-07-09 RX ORDER — ESTRADIOL 0.1 MG/G
2 CREAM VAGINAL NIGHTLY
Qty: 34 G | Refills: 3 | Status: SHIPPED | OUTPATIENT
Start: 2025-07-09 | End: 2026-07-09

## 2025-07-09 SDOH — ECONOMIC STABILITY: TRANSPORTATION INSECURITY
IN THE PAST 12 MONTHS, HAS LACK OF TRANSPORTATION KEPT YOU FROM MEETINGS, WORK, OR FROM GETTING THINGS NEEDED FOR DAILY LIVING?: NO

## 2025-07-09 SDOH — ECONOMIC STABILITY: FOOD INSECURITY: WITHIN THE PAST 12 MONTHS, THE FOOD YOU BOUGHT JUST DIDN'T LAST AND YOU DIDN'T HAVE MONEY TO GET MORE.: NEVER TRUE

## 2025-07-09 SDOH — ECONOMIC STABILITY: FOOD INSECURITY: WITHIN THE PAST 12 MONTHS, YOU WORRIED THAT YOUR FOOD WOULD RUN OUT BEFORE YOU GOT MONEY TO BUY MORE.: NEVER TRUE

## 2025-07-09 SDOH — ECONOMIC STABILITY: TRANSPORTATION INSECURITY
IN THE PAST 12 MONTHS, HAS THE LACK OF TRANSPORTATION KEPT YOU FROM MEDICAL APPOINTMENTS OR FROM GETTING MEDICATIONS?: NO

## 2025-07-09 ASSESSMENT — PATIENT HEALTH QUESTIONNAIRE - PHQ9
2. FEELING DOWN, DEPRESSED OR HOPELESS: NOT AT ALL
1. LITTLE INTEREST OR PLEASURE IN DOING THINGS: NOT AT ALL
SUM OF ALL RESPONSES TO PHQ9 QUESTIONS 1 & 2: 0

## 2025-07-09 ASSESSMENT — LIFESTYLE VARIABLES
HOW OFTEN DO YOU HAVE SIX OR MORE DRINKS ON ONE OCCASION: MONTHLY
HOW MANY STANDARD DRINKS CONTAINING ALCOHOL DO YOU HAVE ON A TYPICAL DAY: 1 OR 2
HOW OFTEN DO YOU HAVE A DRINK CONTAINING ALCOHOL: MONTHLY OR LESS
AUDIT-C TOTAL SCORE: 3
SKIP TO QUESTIONS 9-10: 0

## 2025-07-09 ASSESSMENT — PAIN SCALES - GENERAL: PAINLEVEL_OUTOF10: 0-NO PAIN

## 2025-07-09 NOTE — PROGRESS NOTES
Subjective   Patient ID: Kathrine Ramírez is a 70 y.o. female who presents for Annual Exam (Patient here today for gyn exam/Last PAP/HPV 7/26/17/Mammo 9/308/24/Colon 6/21/22/BMD 2023 [place]/Denies any pain or concerns/TD MA/).  Pt is here her annual exam  Pt needs a mammogram  She had a recent bone density and has low bone mass   She denies vaginal bleeding       Review of Systems   All other systems reviewed and are negative.      Objective   Physical Exam  Constitutional:       Appearance: She is normal weight.   Pulmonary:      Effort: Pulmonary effort is normal.   Chest:   Breasts:     Right: Normal.      Left: Normal.   Genitourinary:     General: Normal vulva.      Vagina: Normal.      Cervix: Normal.      Uterus: Normal.       Adnexa: Right adnexa normal and left adnexa normal.      Rectum: Normal.   Musculoskeletal:      Cervical back: Neck supple.   Skin:     General: Skin is warm.   Psychiatric:         Mood and Affect: Mood normal.         Behavior: Behavior normal.       Assessment/Plan   Diagnoses and all orders for this visit:  Vaginal atrophy  -     estradiol (Estrace) 0.01 % (0.1 mg/gram) vaginal cream; Insert 0.5 Applicatorfuls (2 g) into the vagina once daily at bedtime. At bedtime for 2 weeks, then at bedtime twice a week.  Encounter for screening mammogram for malignant neoplasm of breast  -     BI mammo bilateral screening tomosynthesis; Future  Follow up keyanna Wiley MD 07/10/25 3:28 PM

## 2025-07-16 ENCOUNTER — LAB (OUTPATIENT)
Dept: LAB | Facility: HOSPITAL | Age: 70
End: 2025-07-16
Payer: MEDICARE

## 2025-07-16 DIAGNOSIS — Z00.00 ENCOUNTER FOR GENERAL ADULT MEDICAL EXAMINATION WITHOUT ABNORMAL FINDINGS: Primary | ICD-10-CM

## 2025-07-16 LAB
ALBUMIN SERPL BCP-MCNC: 4.1 G/DL (ref 3.4–5)
ALP SERPL-CCNC: 100 U/L (ref 33–136)
ALT SERPL W P-5'-P-CCNC: 22 U/L (ref 7–45)
ANION GAP SERPL CALC-SCNC: 11 MMOL/L (ref 10–20)
APPEARANCE UR: CLEAR
AST SERPL W P-5'-P-CCNC: 18 U/L (ref 9–39)
BASOPHILS # BLD AUTO: 0.01 X10*3/UL (ref 0–0.1)
BASOPHILS NFR BLD AUTO: 0.1 %
BILIRUB SERPL-MCNC: 0.8 MG/DL (ref 0–1.2)
BILIRUB UR STRIP.AUTO-MCNC: NEGATIVE MG/DL
BUN SERPL-MCNC: 22 MG/DL (ref 6–23)
CALCIUM SERPL-MCNC: 9.1 MG/DL (ref 8.6–10.3)
CHLORIDE SERPL-SCNC: 106 MMOL/L (ref 98–107)
CHOLEST SERPL-MCNC: 159 MG/DL (ref 0–199)
CHOLESTEROL/HDL RATIO: 3.7
CO2 SERPL-SCNC: 26 MMOL/L (ref 21–32)
COLOR UR: YELLOW
CREAT SERPL-MCNC: 0.79 MG/DL (ref 0.5–1.05)
CRP SERPL HS-MCNC: 1.8 MG/L
EGFRCR SERPLBLD CKD-EPI 2021: 80 ML/MIN/1.73M*2
EOSINOPHIL # BLD AUTO: 0.13 X10*3/UL (ref 0–0.4)
EOSINOPHIL NFR BLD AUTO: 1.9 %
ERYTHROCYTE [DISTWIDTH] IN BLOOD BY AUTOMATED COUNT: 12.7 % (ref 11.5–14.5)
EST. AVERAGE GLUCOSE BLD GHB EST-MCNC: 134 MG/DL
GLUCOSE SERPL-MCNC: 107 MG/DL (ref 74–99)
GLUCOSE UR STRIP.AUTO-MCNC: NEGATIVE MG/DL
HBA1C MFR BLD: 6.3 % (ref ?–5.7)
HCT VFR BLD AUTO: 42.5 % (ref 36–46)
HDLC SERPL-MCNC: 43.5 MG/DL
HGB BLD-MCNC: 13.7 G/DL (ref 12–16)
IMM GRANULOCYTES # BLD AUTO: 0 X10*3/UL (ref 0–0.5)
IMM GRANULOCYTES NFR BLD AUTO: 0 % (ref 0–0.9)
KETONES UR STRIP.AUTO-MCNC: NEGATIVE MG/DL
LDLC SERPL CALC-MCNC: 91 MG/DL
LEUKOCYTE ESTERASE UR QL STRIP.AUTO: NEGATIVE
LYMPHOCYTES # BLD AUTO: 1.53 X10*3/UL (ref 0.8–3)
LYMPHOCYTES NFR BLD AUTO: 22.6 %
MCH RBC QN AUTO: 29 PG (ref 26–34)
MCHC RBC AUTO-ENTMCNC: 32.2 G/DL (ref 32–36)
MCV RBC AUTO: 90 FL (ref 80–100)
MONOCYTES # BLD AUTO: 0.52 X10*3/UL (ref 0.05–0.8)
MONOCYTES NFR BLD AUTO: 7.7 %
NEUTROPHILS # BLD AUTO: 4.58 X10*3/UL (ref 1.6–5.5)
NEUTROPHILS NFR BLD AUTO: 67.7 %
NITRITE UR QL STRIP.AUTO: NEGATIVE
NON HDL CHOLESTEROL: 116 MG/DL (ref 0–149)
NRBC BLD-RTO: NORMAL /100{WBCS}
PH UR STRIP.AUTO: 6.5 [PH]
PLATELET # BLD AUTO: 272 X10*3/UL (ref 150–450)
POTASSIUM SERPL-SCNC: 4.5 MMOL/L (ref 3.5–5.3)
PROT SERPL-MCNC: 6.6 G/DL (ref 6.4–8.2)
PROT UR STRIP.AUTO-MCNC: NEGATIVE MG/DL
RBC # BLD AUTO: 4.73 X10*6/UL (ref 4–5.2)
RBC # UR STRIP.AUTO: NEGATIVE MG/DL
SODIUM SERPL-SCNC: 138 MMOL/L (ref 136–145)
SP GR UR STRIP.AUTO: 1.02
TRIGL SERPL-MCNC: 125 MG/DL (ref 0–149)
TSH SERPL-ACNC: 3.16 MIU/L (ref 0.44–3.98)
UROBILINOGEN UR STRIP.AUTO-MCNC: 0.2 MG/DL
VLDL: 25 MG/DL (ref 0–40)
WBC # BLD AUTO: 6.8 X10*3/UL (ref 4.4–11.3)

## 2025-07-16 PROCEDURE — 83036 HEMOGLOBIN GLYCOSYLATED A1C: CPT

## 2025-07-16 PROCEDURE — 82306 VITAMIN D 25 HYDROXY: CPT

## 2025-07-16 PROCEDURE — 86141 C-REACTIVE PROTEIN HS: CPT

## 2025-07-16 PROCEDURE — 36415 COLL VENOUS BLD VENIPUNCTURE: CPT

## 2025-07-16 PROCEDURE — 84443 ASSAY THYROID STIM HORMONE: CPT

## 2025-07-16 PROCEDURE — 81003 URINALYSIS AUTO W/O SCOPE: CPT

## 2025-07-16 PROCEDURE — 80061 LIPID PANEL: CPT

## 2025-07-16 PROCEDURE — 85025 COMPLETE CBC W/AUTO DIFF WBC: CPT

## 2025-07-16 PROCEDURE — 80053 COMPREHEN METABOLIC PANEL: CPT

## 2025-07-17 LAB
25(OH)D3 SERPL-MCNC: 44 NG/ML (ref 30–100)
HOLD SPECIMEN: NORMAL

## 2025-07-21 ENCOUNTER — APPOINTMENT (OUTPATIENT)
Dept: ENDOCRINOLOGY | Facility: CLINIC | Age: 70
End: 2025-07-21
Payer: MEDICARE

## 2025-07-21 VITALS — WEIGHT: 133 LBS | DIASTOLIC BLOOD PRESSURE: 70 MMHG | SYSTOLIC BLOOD PRESSURE: 130 MMHG | BODY MASS INDEX: 27.8 KG/M2

## 2025-07-21 DIAGNOSIS — I10 PRIMARY HYPERTENSION: ICD-10-CM

## 2025-07-21 DIAGNOSIS — E78.5 HYPERLIPIDEMIA, UNSPECIFIED HYPERLIPIDEMIA TYPE: ICD-10-CM

## 2025-07-21 DIAGNOSIS — E88.810 DYSMETABOLIC SYNDROME: Primary | ICD-10-CM

## 2025-07-21 PROCEDURE — 99214 OFFICE O/P EST MOD 30 MIN: CPT | Performed by: INTERNAL MEDICINE

## 2025-07-21 PROCEDURE — 3078F DIAST BP <80 MM HG: CPT | Performed by: INTERNAL MEDICINE

## 2025-07-21 PROCEDURE — 3075F SYST BP GE 130 - 139MM HG: CPT | Performed by: INTERNAL MEDICINE

## 2025-07-21 NOTE — PROGRESS NOTES
Patient ID: Kathrine Ramírez is a 70 y.o. female who presents for Follow-up (Dysmetabolic syndrome follow up).  HPI  The patient comes in for follow up.    She has prediabetes impaired fasting glucose insulin resistance hyperlipidemia.    Exercise was limited because of bursitis.    She has been heading back to it.    She has been watching her diet but has not been consistent.    Physically she has no complaints.      ROS  Comprehensive review of systems is negative.    Objective   Physical Exam  Visit Vitals  /70      Vitals:    07/21/25 1159   Weight: 60.3 kg (133 lb)      Body mass index is 27.8 kg/m².      Weight 133 up 5 pounds still down 4    Eyes normal  ENT normal. No adenopathy  Thyroid palpable and normal. No nodules  Chest clear to auscultation  Heart sounds are normal  Abdomen nontender. Bowel sounds normal. No organomegaly  Feet are okay    Current Medications[1]    Assessment/Plan     1.  Prediabetes  2.  Impaired fasting glucose  3.  Hyperlipidemia  4.  Insulin resistance    We reviewed her blood work from last week.    She will work on diet and exercise.    We again discussed metformin as an option or if she becomes diabetic considering a GLP-1 receptor agonist.    She will follow-up with me in 6 months sooner as needed.             [1]   Current Outpatient Medications   Medication Sig Dispense Refill    atorvastatin (Lipitor) 40 mg tablet Take 1 tablet (40 mg) by mouth once daily. 90 tablet 3    cholecalciferol (Vitamin D-3) 25 MCG (1000 UT) capsule Take by mouth.      estradiol (Estrace) 0.01 % (0.1 mg/gram) vaginal cream Insert 0.5 Applicatorfuls (2 g) into the vagina 3 times a week. 72 g 3    estradiol (Estrace) 0.01 % (0.1 mg/gram) vaginal cream Insert 0.5 Applicatorfuls (2 g) into the vagina once daily at bedtime. At bedtime for 2 weeks, then at bedtime twice a week. 34 g 3    meloxicam (Mobic) 15 mg tablet TAKE ONE TABLET BY MOUTH ONCE DAILY 30 tablet 0    olmesartan (BENIcar) 5 mg tablet  Take 1 tablet (5 mg) by mouth once daily. 90 tablet 0    predniSONE (Deltasone) 20 mg tablet Take 1 tablet (20 mg) by mouth once daily. 3 tablet 0     No current facility-administered medications for this visit.

## 2025-07-23 DIAGNOSIS — M15.0 PRIMARY OSTEOARTHRITIS INVOLVING MULTIPLE JOINTS: ICD-10-CM

## 2025-07-23 RX ORDER — MELOXICAM 15 MG/1
15 TABLET ORAL DAILY
Qty: 30 TABLET | Refills: 0 | Status: SHIPPED | OUTPATIENT
Start: 2025-07-23

## 2025-07-26 NOTE — PROGRESS NOTES
Physical Exam    Name Kathrine Ramírez    Date of Service :7/26/2025      Kathrine Ramírez  70 y.o. is here for an annual physical exam    Past medical history significant for  Infertility but did conceive 1 child on her own and hospitalized for childbirth x1 she adopted a child also  Arthritis DJD knees most recently low back pain with sciatica right side has been the issue  Renal calculi on a few occasions 2010 2016 recently September 2022  Left foot pain now is the worst   CT scan at that time showed right kidney stone and hydronephrosis  last 12/30  also question of a left ovarian lesion  which was found incidentally on CT had UTI thought was stone had   Osteopenia had been on Fosamax for over 10 years she has been off medications and bone density has been pretty stable  last bone density scan was performed in [place] 2023 and worst T-score was -2.2  Hypercholesteremia on statin therapy  She did have an elevated coronary artery calcium score in this past year 135.6   Hypertension currently with good control on low-dose medications olmesartan  Chronic postnasal drip not currently taking medications  Hyperglycemia and has followed routinely with endocrinology has considered going on metformin she sees Dr Aquino  She does follow with breast specialist because of increased risk of breast cancer  Low back pain  Trochanteric bursitis  They live in [place] 6 months of the year     She is enjoying her 2 grandchildren who live here in [place] now a grandson and a granddaughter    Last colonoscopy  2022 5-year repeat    Medical History[1]    Surgical History[2]     Social History[3]     Social History     Social History Narrative    She is originally from [place] has been in [place] for some time since 1988 then moved for her 's work    She has 2 children 1 biologic son who lives in [place] and adopted daughter who lives here in the [place] area    1 grandson and granddaughter on way     They spend 6 months  "of the year in [place]    Her diet is pretty healthy    Not  a lot of resistance training     She exercises routinely walks daily plays a lot of pickleball    Has never been a smoker    She does not drink alcohol    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    Physical Exam  Physical examination  Reveals a well-developed woman in no acute distress    appearance is age-appropriate  HEENT exam  Extraocular motion is intact  Tympanic membranes and external auditory canals are normal  Oropharynx is normal  There is no cervical lymphadenopathy appreciated  The thyroid is within normal limits    Lungs    clear to auscultation and percussion    Cardiovascular   regular rate and rhythm  No murmurs rubs or gallops are appreciated    Breast examination   No dominant masses nipple discharge or axillary lymphadenopathy is appreciated    Abdomen   soft nontender bowel sounds are positive   there is no organomegaly noted      Periphery  Pulses are present without deficits noted  No peripheral edema is noted    Musculoskeletal  Gait is normal  Is no joint erythema or swelling noted  Range of motion is within normal limits  Strength is 5 of 5 without deficits noted    Dermatology  No concerning skin lesions are noted    Neurology  No deficits are noted  Judgment appears appropriate  Mood and affect are appropriate                      No lab exists for component: \"LABALBU\"                       No lab exists for component: \"UAPPEAR\", \"UCOLOR\"  No components found for: \"UA\"  Orders Only on 07/08/2025   Component Date Value Ref Range Status    WBC 07/16/2025 6.8  4.4 - 11.3 x10*3/uL Final    nRBC 07/16/2025    Final    Not Measured    RBC 07/16/2025 4.73  4.00 - 5.20 x10*6/uL Final    Hemoglobin 07/16/2025 " 13.7  12.0 - 16.0 g/dL Final    Hematocrit 07/16/2025 42.5  36.0 - 46.0 % Final    MCV 07/16/2025 90  80 - 100 fL Final    MCH 07/16/2025 29.0  26.0 - 34.0 pg Final    MCHC 07/16/2025 32.2  32.0 - 36.0 g/dL Final    RDW 07/16/2025 12.7  11.5 - 14.5 % Final    Platelets 07/16/2025 272  150 - 450 x10*3/uL Final    Neutrophils % 07/16/2025 67.7  40.0 - 80.0 % Final    Immature Granulocytes %, Automated 07/16/2025 0.0  0.0 - 0.9 % Final    Immature Granulocyte Count (IG) includes promyelocytes, myelocytes and metamyelocytes but does not include bands. Percent differential counts (%) should be interpreted in the context of the absolute cell counts (cells/UL).    Lymphocytes % 07/16/2025 22.6  13.0 - 44.0 % Final    Monocytes % 07/16/2025 7.7  2.0 - 10.0 % Final    Eosinophils % 07/16/2025 1.9  0.0 - 6.0 % Final    Basophils % 07/16/2025 0.1  0.0 - 2.0 % Final    Neutrophils Absolute 07/16/2025 4.58  1.60 - 5.50 x10*3/uL Final    Percent differential counts (%) should be interpreted in the context of the absolute cell counts (cells/uL).    Immature Granulocytes Absolute, Au* 07/16/2025 0.00  0.00 - 0.50 x10*3/uL Final    Lymphocytes Absolute 07/16/2025 1.53  0.80 - 3.00 x10*3/uL Final    Monocytes Absolute 07/16/2025 0.52  0.05 - 0.80 x10*3/uL Final    Eosinophils Absolute 07/16/2025 0.13  0.00 - 0.40 x10*3/uL Final    Basophils Absolute 07/16/2025 0.01  0.00 - 0.10 x10*3/uL Final    Glucose 07/16/2025 107 (H)  74 - 99 mg/dL Final    Sodium 07/16/2025 138  136 - 145 mmol/L Final    Potassium 07/16/2025 4.5  3.5 - 5.3 mmol/L Final    Chloride 07/16/2025 106  98 - 107 mmol/L Final    Bicarbonate 07/16/2025 26  21 - 32 mmol/L Final    Anion Gap 07/16/2025 11  10 - 20 mmol/L Final    Urea Nitrogen 07/16/2025 22  6 - 23 mg/dL Final    Creatinine 07/16/2025 0.79  0.50 - 1.05 mg/dL Final    eGFR 07/16/2025 80  >60 mL/min/1.73m*2 Final    Calculations of estimated GFR are performed using the 2021 CKD-EPI Study Refit equation  without the race variable for the IDMS-Traceable creatinine methods.  https://jasn.asnjournals.org/content/early/2021/09/22/ASN.1132029568    Calcium 07/16/2025 9.1  8.6 - 10.3 mg/dL Final    Albumin 07/16/2025 4.1  3.4 - 5.0 g/dL Final    Alkaline Phosphatase 07/16/2025 100  33 - 136 U/L Final    Total Protein 07/16/2025 6.6  6.4 - 8.2 g/dL Final    AST 07/16/2025 18  9 - 39 U/L Final    Bilirubin, Total 07/16/2025 0.8  0.0 - 1.2 mg/dL Final    ALT 07/16/2025 22  7 - 45 U/L Final    Patients treated with Sulfasalazine may generate falsely decreased results for ALT.    Cholesterol 07/16/2025 159  0 - 199 mg/dL Final          Age      Desirable   Borderline High   High     0-19 Y     0 - 169       170 - 199     >/= 200    20-24 Y     0 - 189       190 - 224     >/= 225         >24 Y     0 - 199       200 - 239     >/= 240   **All ranges are based on fasting samples. Specific   therapeutic targets will vary based on patient-specific   cardiac risk.    Pediatric guidelines reference:Pediatrics 2011, 128(S5).Adult guidelines reference: NCEP ATPIII Guidelines,IRENE 2001, 258:2486-97    Venipuncture immediately after or during the administration of Metamizole may lead to falsely low results. Testing should be performed immediately prior to Metamizole dosing.    HDL-Cholesterol 07/16/2025 43.5  mg/dL Final      Age       Very Low   Low     Normal    High    0-19 Y    < 35      < 40     40-45     ----  20-24 Y    ----     < 40      >45      ----        >24 Y      ----     < 40     40-60      >60      Cholesterol/HDL Ratio 07/16/2025 3.7   Final      Ref Values  Desirable  < 3.4  High Risk  > 5.0    LDL Calculated 07/16/2025 91  <=99 mg/dL Final                                Near   Borderline      AGE      Desirable  Optimal    High     High     Very High     0-19 Y     0 - 109     ---    110-129   >/= 130     ----    20-24 Y     0 - 119     ---    120-159   >/= 160     ----      >24 Y     0 -  99   100-129  130-159    160-189     >/=190    LDL Cholesterol is calculated using the Friedewald equation.    VLDL 07/16/2025 25  0 - 40 mg/dL Final    Triglycerides 07/16/2025 125  0 - 149 mg/dL Final    Age              Desirable        Borderline         High        Very High  SEX:B           mg/dL             mg/dL               mg/dL      mg/dL  <=14D                       ----               ----        ----  15D-365D                    ----               ----        ----  1Y-9Y           0-74               75-99             >=100       ----  10Y-19Y        0-89                            >=130       ----  20Y-24Y        0-114             115-149             >=150      ----  >= 25Y         0-149             150-199             200-499    >=500      Venipuncture immediately after or during the administration of Metamizole may lead to falsely low results. Testing should be performed immediately prior to Metamizole dosing.    Non HDL Cholesterol 07/16/2025 116  0 - 149 mg/dL Final          Age       Desirable   Borderline High   High     Very High     0-19 Y     0 - 119       120 - 144     >/= 145    >/= 160    20-24 Y     0 - 149       150 - 189     >/= 190      ----         >24 Y    30 mg/dL above LDL Cholesterol goal      Thyroid Stimulating Hormone 07/16/2025 3.16  0.44 - 3.98 mIU/L Final    CRP, High Sensitivity 07/16/2025 1.8 (H)  <1.0 mg/L Final    Vitamin D, 25-Hydroxy, Total 07/16/2025 44  30 - 100 ng/mL Final    Hemoglobin A1C 07/16/2025 6.3 (H)  See comment % Final    Estimated Average Glucose 07/16/2025 134  Not Established mg/dL Final    Color, Urine 07/16/2025 Yellow  Straw, Yellow Final    Appearance, Urine 07/16/2025 Clear  Clear Final    Specific Gravity, Urine 07/16/2025 1.020  1.005 - 1.035 Final    pH, Urine 07/16/2025 6.5  5.0, 5.5, 6.0, 6.5, 7.0, 7.5, 8.0 Final    Protein, Urine 07/16/2025 NEGATIVE  NEGATIVE, TRACE mg/dL Final    Glucose, Urine 07/16/2025 NEGATIVE  NEGATIVE mg/dL Final    Blood,  Urine 07/16/2025 NEGATIVE  NEGATIVE mg/dL Final    Ketones, Urine 07/16/2025 NEGATIVE  NEGATIVE mg/dL Final    Bilirubin, Urine 07/16/2025 NEGATIVE  NEGATIVE mg/dL Final    Urobilinogen, Urine 07/16/2025 0.2  0.2, 1.0 mg/dL Final    Nitrite, Urine 07/16/2025 NEGATIVE  NEGATIVE Final    Leukocyte Esterase, Urine 07/16/2025 NEGATIVE  NEGATIVE Final     [unfilled]   Imaging  No results found.    Cardiology, Vascular, and Other Imaging  No other imaging results found for the past 2 days     The 10-year ASCVD risk score (Remedios WHEELER, et al., 2019) is: 14.1%    Values used to calculate the score:      Age: 70 years      Clincally relevant sex: Female      Is Non- : No      Diabetic: No      Tobacco smoker: No      Systolic Blood Pressure: 130 mmHg      Is BP treated: Yes      HDL Cholesterol: 43.5 mg/dL      Total Cholesterol: 159 mg/dL   .  ECG: normal sinus rhythm, no blocks or conduction defects, no ischemic changes.  Nsinus bradycardia no ischemic changes noted    Problem List Items Addressed This Visit    None      Assessment/Plan   #1 hypercholesteremia with elevated coronary artery calcium score has been having some issues with arm heaviness with exercise this could represent atypical angina and therefore I would like to do stress testing we will no evidence of any cholesterol is slightly elevated continue current regimen    2 hypertension blood pressure now controlled continue current regimen she is on low-dose home olmesartan we did discuss that meloxicam can elevate blood pressure readings and she is going to try using meloxicam more on a as needed basis  2.  DJD seems to be doing okay right now she has occasional pain in her hand and foot she did try Voltaren gel her back is okay her knees are okay again we will use meloxicam as needed  3.  Fibrocystic breast disease follow routinely with high risk breast mammogram will be due in October already scheduled  4.  Osteopenia had been on  bisphosphonate therapy will be due for bone density after November 9 she did this in [place] requisition was given to repeat at the same place  5.  Health maintenance  Continue routine regular exercise  Up-to-date with immunizations  Up-to-date with mammograms  Last colonoscopy 2022 5-year repeat or 2027  Consider Galleri testing        [1]   Past Medical History:  Diagnosis Date    Acute upper respiratory infection, unspecified 09/06/2017    Acute URI    Allergic     Arthritis     Encounter for gynecological examination (general) (routine) without abnormal findings 06/10/2021    Well female exam with routine gynecological exam    Female infertility     Fibroid 2021    Flushing     Hot flashes    Hypertension 2024    Kidney stone 06/16/2016    Other conditions influencing health status     History of pregnancy    Personal history of other (healed) physical injury and trauma 09/06/2017    History of abrasion    Personal history of other diseases of the female genital tract 06/16/2015    History of postmenopausal bleeding    Superficial mycosis, unspecified 09/20/2017    Superficial fungal infection of skin    Urinary tract infection 05/20/2025    Varicella 1982   [2]   Past Surgical History:  Procedure Laterality Date    ADENOIDECTOMY  1957    COSMETIC SURGERY  August 7, 2024    DILATION AND CURETTAGE OF UTERUS  11/26/2013    Dilation And Curettage    LAPAROSCOPY DIAGNOSTIC / BIOPSY / ASPIRATION / LYSIS  11/26/2013    Exploratory Laparoscopy    TONSILLECTOMY  1957    WISDOM TOOTH EXTRACTION  1974   [3]   Social History  Tobacco Use    Smoking status: Never    Smokeless tobacco: Never   Vaping Use    Vaping status: Never Used   Substance Use Topics    Alcohol use: Yes    Drug use: Never   [4]   Family History  Problem Relation Name Age of Onset    Dementia Mother Salma Ck Cruz 85        lived until 96    Arthritis Mother Salma Cruz     Asthma Mother Salma Cruz     Blood clot Mother Salma Cruz      Lymphoma Father Pradeep Cruz           at 80    Coronary artery disease Father Pradeep Cruz 68    Arthritis Father Pradeep Cruz     Cancer Father Pradeep Cruz     Other (gout) Brother      Nephrolithiasis Brother      Breast cancer Mother's Sister Velia Blair     Cancer Mother's Sister Zeinab Ck Self     Heart disease Father Pradeep Cruz     Hearing loss Brother Justin Cruz 60 - 69    Dementia Mother Salma Cruz 85    Asthma Mother Salma Cruz     Arthritis Mother Salma Cruz     Hearing loss Mother Salma Cruz 70 - 79    Blood clot Mother Salma Cruz     Arthritis Father Pradeep Riosz     Heart disease Father Pradeep Riosz     Breast cancer Mother's Sister Zeinab michelle

## 2025-07-28 ENCOUNTER — APPOINTMENT (OUTPATIENT)
Dept: PRIMARY CARE | Facility: CLINIC | Age: 70
End: 2025-07-28
Payer: MEDICARE

## 2025-07-30 ENCOUNTER — APPOINTMENT (OUTPATIENT)
Dept: PRIMARY CARE | Facility: CLINIC | Age: 70
End: 2025-07-30
Payer: MEDICARE

## 2025-07-30 VITALS
OXYGEN SATURATION: 97 % | DIASTOLIC BLOOD PRESSURE: 64 MMHG | BODY MASS INDEX: 27.72 KG/M2 | WEIGHT: 132.06 LBS | HEIGHT: 58 IN | HEART RATE: 74 BPM | SYSTOLIC BLOOD PRESSURE: 128 MMHG

## 2025-07-30 DIAGNOSIS — Z00.00 PHYSICAL EXAM: ICD-10-CM

## 2025-07-30 DIAGNOSIS — Z78.0 ASYMPTOMATIC MENOPAUSAL STATE: Primary | ICD-10-CM

## 2025-07-30 DIAGNOSIS — R07.89 OTHER CHEST PAIN: ICD-10-CM

## 2025-07-30 NOTE — PATIENT INSTRUCTIONS
It was good to see you.  You are doing a good job with your overall health care.   Cholesterol is at goal   Sugar and hemoglobin A1c remain elevated continue to watch carbohydrate intake consider metformin lets do a nutritional consult as well  Overall blood pressure is acceptable  As we discussed we can have you take the meloxicam more on a as needed basis than daily to see if this makes a difference in your blood pressure.  We also know that anti-inflammatories can affect kidney function as well as blood pressure  I would like to obtain a stress echo because of the arm heaviness she sometimes experience with exercise  [phone]  You will be due for bone density after November 9  Stay up-to-date with mammograms  and  Yuridia   Lets try either Flonase nasal spray or Nasacort nasal spray 2 squirts into each nostril daily for the cough which I think may be coming more from postnasal drip  You are up-to-date with all immunizations  Repeat colonoscopy 2027  Consider Galleri testing

## 2025-08-01 ENCOUNTER — NUTRITION (OUTPATIENT)
Dept: PRIMARY CARE | Facility: CLINIC | Age: 70
End: 2025-08-01
Payer: MEDICARE

## 2025-08-01 VITALS — BODY MASS INDEX: 27.71 KG/M2 | HEIGHT: 58 IN | WEIGHT: 132 LBS

## 2025-08-01 DIAGNOSIS — Z00.00 HEALTH MAINTENANCE EXAMINATION: Primary | ICD-10-CM

## 2025-08-01 NOTE — PROGRESS NOTES
It was a pleasure meeting Mrs. Kathrine Ramírez for an initial nutrition assessment at [facility] [place] to [place] diet and nutrition as part of the  [facility]. Patient referred for   1. Health maintenance examination [Z00.00]        .      Nutrition Assessment    Problem List[1]    Nutrition History:  Food & Nutrition History:   She has a history of kidney stones (they are calcium based stones).  She last had one in December.  She he has had 4 since 2016. She also has a history of prediabetes.  She states she is not very good with her fluids.  She has been adding Liquid IV to her water.  She uses both the sweetened option and the sugar-free option. She has also been adding lemon to her water.      She reports eating 3 meals a day.  She will snack on an apple or plum in the evening (7-8pm).      Food Allergies: None;  Food Intolerances: mussels (vomits)  Vitamin/mineral intake: Vitamin D  Herbal supplements: None  Medication and Complementary/Alternative Medicine Use: None  GI Symptoms: constipation  She has a bowel movement every 2-3 days  Mouth Issues: denies; Teeth Issues: no issues  Sleep Habits: she gets up to use the bathroom once during the night, 6-7 hours of disrupted sleep    Diet Recall:  24 Hour Food Recall  Meal 1: Greek yogurt with Splenda  Meal 2: Chipotle bowl-chicken, fajita vegetables, leafy greens, black beans  Meal 3: out to eat-halibut tacos (3) on flour tortilla, coleslaw/salad  Snack: 7:15pm - ice cream ([facility])    Additional Nutrition History:   Food Variety: Present  Oral Nutrition Supplement Use: None   Fluid Intake: water-16oz a day; alcohol-none    Food Preparation:  Cooking: Partner/Spouse  Grocery Shopping: Partner/Spouse  Dining Out: more than 3 times a week, ~5 days a week    Physical Activity:   Walking, 3-5 days a week, 20-30 minutes  Strength training-weights/machines, 3-5 days a week, 5 minutes only upper body exercises    Anthropometrics:  Height: (!) 147.3 cm (4'  "10\")   Weight: 59.9 kg (132 lb)   BMI (Calculated): 27.6                 Weight History:   Daily Weight  08/01/25 : 59.9 kg (132 lb)  07/30/25 : 59.9 kg (132 lb 0.9 oz)  07/30/25 : 59.9 kg (132 lb 0.9 oz)  07/21/25 : 60.3 kg (133 lb)  07/09/25 : 60.4 kg (133 lb 3.2 oz)  05/21/25 : 59 kg (130 lb)  05/20/25 : 58.1 kg (128 lb)  10/16/24 : 58.1 kg (128 lb)  09/30/24 : 61.2 kg (134 lb 14.7 oz)  09/30/24 : 59.9 kg (132 lb)         Nutrition Significant Labs:  CMP Trend:    Recent Labs     07/16/25  1102 05/20/25  1541 09/23/24  1010   GLUCOSE 107* 111* 107*    136 140   K 4.5 4.2 4.5    106 105   CO2 26 21 26   ANIONGAP 11 13 14   BUN 22 17 25*   CREATININE 0.79 0.65 0.78   EGFR 80 >90 82   CALCIUM 9.1 9.3 9.7   ALBUMIN 4.1 4.3 4.4   ALKPHOS 100 105 97   PROT 6.6 7.0 6.9   AST 18 26 15   BILITOT 0.8 0.7 0.6   ALT 22 32 18   , DM Specific Labs Trend (Includes HgbA1C, antibodies & fasting insulin):   Recent Labs     07/16/25  1102 09/23/24  1010 06/02/23  0917   HGBA1C 6.3* 6.3* 6.4*   , Lipid Panel Trend:    Recent Labs     07/16/25  1102 09/23/24  1010 06/02/23  0917 06/30/21  0754 10/14/20  0808   CHOL 159 148 159 161 171   HDL 43.5 46.8 49.7 43.6 47.0   LDLCALC 91 77  --   --   --    LDLF  --   --  95 97 104*   VLDL 25 24 15 21 20   TRIG 125 121 74 104 102   , Vitamin B12: No results found for: \"[id number]\" , Vitamin D:   Lab Results   Component Value Date    VITD25 44 07/16/2025    , CRP Trend (last 3):   Lab Results   Component Value Date    HSCRP 1.8 (H) 07/16/2025    HSCRP 1.8 (H) 09/23/2024    HSCRP 2.0 06/02/2023        Medications:  Current Outpatient Medications   Medication Instructions    atorvastatin (LIPITOR) 40 mg, oral, Daily    cholecalciferol (Vitamin D-3) 25 MCG (1000 UT) capsule Take by mouth.    estradiol (ESTRACE) 2 g, vaginal, Nightly, At bedtime for 2 weeks, then at bedtime twice a week.    meloxicam (MOBIC) 15 mg, oral, Daily    olmesartan (BENICAR) 5 mg, oral, Daily        Estimated " Needs:  Total Energy Estimated Needs in 24 hours (kCal): 1307 kCal; Method for Estimating Needs: [place] St. Jeor x 1.3 activity factor    Total Protein Estimated Needs in 24 Hours (g): 80 g; Method for Estimating 24 Hour Protein Needs: 0.6g/lb, 132lbs     Total Fiber Estimated Needs (g): 25 g       Nutrition Diagnosis   Malnutrition Diagnosis  Patient has Malnutrition Diagnosis: No    Nutrition Diagnosis  Patient has Nutrition Diagnosis: Yes  Diagnosis Status (1): New  Nutrition Diagnosis 1: Food and nutrition related knowledge deficit  Related to (1): patient presenting with questions regarding appropriate dietary modifications for kidney stones  As Evidenced by (1): patient with longstanding history of kidney stones  Additional Nutrition Diagnosis: Diagnosis 2  Diagnosis Status (2): New  Nutrition Diagnosis 2: Inadequate fiber intake  Related to (2): food- and nutrition-related knowledge deficit regarding appropriate fiber intake  As Evidenced by (2): food recall showing she is not meeting the recommended 25g fiber per day       Nutrition Interventions/Recommendations   Nutrition Prescription:  Individualized Nutrition Prescription Provided for : Oral nutrition increased fiber diet, increased fluid diet, decreased sodium    Nutrition Interventions:   Food and Nutrient Delivery:   Meals & Snacks: Fiber-modified diet, Fluid-modified diet, Mineral-modified diet       Nutrition Education: Content related nutrition education   Dietary modifications to prevent kidney stones    Educational Handouts Provided: Kidney Stones Nutrition Therapy (NCM)    Nutrition Recommendations:   1) Start by setting a goal to drink at least 40-60oz a day (ultimate goal 90-100oz).  This is the best way to lower your risk of forming new kidney stones. Try dividing your day into 3 parts and aim to drink 16-20oz in each of those 3 parts.    2) Reduce urine calcium by reducing your salt intake. Avoid particularly high salt foods such as cheese,  processed meats, salty snacks, canned vegetables, etc.    3) Reduce urine calcium by balancing your diet.  Try to keep protein portions close to 3oz per meal. Meats, fish, and poultry cause the highest acid load, which may cause your bones to release more calcium into the bloodstream than they should. This can add to high urine calcium.    4) Eat fewer high oxalate foods: spinach, rhubarb, beets, potato chips, french fries, nuts and nut butters.  Sunflower seed butter is a good alternative to nut butter.    5) Recommended supplements:  -Pure Encapsulations magnesium citrate, 300-500mg daily (take at bedtime)  -Tomorrow's Nutrition Sunfiber, 1 packet mixed in water daily    6) Options for breakfast to help boost protein and fiber at this meal:  -Greek yogurt (at least 20g protein) + 1/2 cup berries + 1-2 Tbsp barbara/flax seeds  -homemade smoothie made with protein powder (Naked Whey protein powder) + berries + seeds or sunflower seed butter    Nutrition Counseling:   Nutrition Counseling Strategies : Nutrition counseling based on goal setting strategy       Nutrition Monitoring and Evaluation   Fluid Intake: Estimated fluid intake  Criteria: monitor patient's progress towards fluid goal  Estimated fiber intake: Estimated fiber intake  Criteria: monitor patient's progress towards fiber goal                        Goal Status: New goal identified       Follow Up: Patient will contact Doctor's MA to schedule follow up appointment          [1]   Patient Active Problem List  Diagnosis    Dense breasts    Dysmetabolic syndrome    Hyperglycemia    Hyperlipidemia    Kidney stones    Osteopenia    Primary hypertension    Well woman exam with routine gynecological exam    Pelvic mass

## 2025-08-04 ENCOUNTER — HOSPITAL ENCOUNTER (OUTPATIENT)
Dept: CARDIOLOGY | Facility: HOSPITAL | Age: 70
Discharge: HOME | End: 2025-08-04
Payer: MEDICARE

## 2025-08-04 DIAGNOSIS — R07.89 OTHER CHEST PAIN: ICD-10-CM

## 2025-08-04 PROCEDURE — 93017 CV STRESS TEST TRACING ONLY: CPT

## 2025-08-04 PROCEDURE — 93016 CV STRESS TEST SUPVJ ONLY: CPT

## 2025-08-04 PROCEDURE — 93018 CV STRESS TEST I&R ONLY: CPT

## 2025-08-04 PROCEDURE — 93350 STRESS TTE ONLY: CPT

## 2025-08-05 DIAGNOSIS — I10 PRIMARY HYPERTENSION: ICD-10-CM

## 2025-08-05 RX ORDER — OLMESARTAN MEDOXOMIL 5 MG/1
10 TABLET, FILM COATED ORAL DAILY
Qty: 180 TABLET | Refills: 1 | Status: SHIPPED | OUTPATIENT
Start: 2025-08-05 | End: 2025-08-05 | Stop reason: SDUPTHER

## 2025-08-05 RX ORDER — OLMESARTAN MEDOXOMIL 5 MG/1
10 TABLET, FILM COATED ORAL DAILY
Qty: 180 TABLET | Refills: 1 | Status: SHIPPED | OUTPATIENT
Start: 2025-08-05

## 2025-09-02 DIAGNOSIS — M15.0 PRIMARY OSTEOARTHRITIS INVOLVING MULTIPLE JOINTS: ICD-10-CM

## 2025-09-02 RX ORDER — MELOXICAM 15 MG/1
15 TABLET ORAL DAILY
Qty: 30 TABLET | Refills: 0 | Status: SHIPPED | OUTPATIENT
Start: 2025-09-02

## 2025-09-11 ENCOUNTER — APPOINTMENT (OUTPATIENT)
Dept: OPHTHALMOLOGY | Facility: CLINIC | Age: 70
End: 2025-09-11
Payer: MEDICARE

## 2025-10-01 ENCOUNTER — APPOINTMENT (OUTPATIENT)
Dept: PRIMARY CARE | Facility: CLINIC | Age: 70
End: 2025-10-01
Payer: MEDICARE

## 2026-05-13 ENCOUNTER — APPOINTMENT (OUTPATIENT)
Dept: ENDOCRINOLOGY | Facility: CLINIC | Age: 71
End: 2026-05-13
Payer: MEDICARE